# Patient Record
Sex: FEMALE | Race: OTHER | HISPANIC OR LATINO | Employment: FULL TIME | URBAN - METROPOLITAN AREA
[De-identification: names, ages, dates, MRNs, and addresses within clinical notes are randomized per-mention and may not be internally consistent; named-entity substitution may affect disease eponyms.]

---

## 2023-06-08 ENCOUNTER — TELEPHONE (OUTPATIENT)
Dept: PRIMARY CARE | Facility: CLINIC | Age: 38
End: 2023-06-08
Payer: COMMERCIAL

## 2023-06-08 DIAGNOSIS — L24.7 IRRITANT CONTACT DERMATITIS DUE TO PLANTS, EXCEPT FOOD: Primary | ICD-10-CM

## 2023-06-08 RX ORDER — PREDNISONE 50 MG/1
50 TABLET ORAL DAILY
Qty: 5 TABLET | Refills: 0 | Status: SHIPPED | OUTPATIENT
Start: 2023-06-08 | End: 2023-06-13

## 2023-06-08 NOTE — TELEPHONE ENCOUNTER
Former [facility] patient with [facility]. Her mother Susy is here for her own appointment and brought in pictures of Cassie's rash.   Prednisone 50 mg x 5 days sent to pharmacy. Patient aware.  Chanda Palma, DO

## 2023-06-08 NOTE — TELEPHONE ENCOUNTER
LMOM for patient to call office, when call is returned please give patient provider's message. Attempted to leave a VM, but got a busy signal.

## 2023-06-08 NOTE — TELEPHONE ENCOUNTER
Former [facility] patient last seen in December. Needs to establish with new PCP. She is  so I am ok taking her on as a new patient if she would like to see me. Please reach out to offer to schedule - 30 min appointment. Thanks,  Chanda Palma, DO

## 2023-07-25 LAB
ALANINE AMINOTRANSFERASE (SGPT) (U/L) IN SER/PLAS: 7 U/L (ref 7–45)
ALBUMIN (G/DL) IN SER/PLAS: 4.6 G/DL (ref 3.4–5)
ALKALINE PHOSPHATASE (U/L) IN SER/PLAS: 44 U/L (ref 33–110)
ANION GAP IN SER/PLAS: 10 MMOL/L (ref 10–20)
ASPARTATE AMINOTRANSFERASE (SGOT) (U/L) IN SER/PLAS: 12 U/L (ref 9–39)
BILIRUBIN TOTAL (MG/DL) IN SER/PLAS: 0.4 MG/DL (ref 0–1.2)
CALCIDIOL (25 OH VITAMIN D3) (NG/ML) IN SER/PLAS: 20 NG/ML
CALCIUM (MG/DL) IN SER/PLAS: 8.7 MG/DL (ref 8.6–10.3)
CARBON DIOXIDE, TOTAL (MMOL/L) IN SER/PLAS: 29 MMOL/L (ref 21–32)
CHLORIDE (MMOL/L) IN SER/PLAS: 106 MMOL/L (ref 98–107)
CREATININE (MG/DL) IN SER/PLAS: 1.08 MG/DL (ref 0.5–1.05)
GFR FEMALE: 66 ML/MIN/1.73M2
GLUCOSE (MG/DL) IN SER/PLAS: 88 MG/DL (ref 74–99)
POTASSIUM (MMOL/L) IN SER/PLAS: 3.9 MMOL/L (ref 3.5–5.3)
PROTEIN TOTAL: 7.2 G/DL (ref 6.4–8.2)
SODIUM (MMOL/L) IN SER/PLAS: 141 MMOL/L (ref 136–145)
THYROTROPIN (MIU/L) IN SER/PLAS BY DETECTION LIMIT <= 0.05 MIU/L: 55 MIU/L (ref 0.44–3.98)
THYROXINE (T4) FREE (NG/DL) IN SER/PLAS: 0.46 NG/DL (ref 0.61–1.12)
UREA NITROGEN (MG/DL) IN SER/PLAS: 11 MG/DL (ref 6–23)

## 2023-07-26 LAB
ESTIMATED AVERAGE GLUCOSE FOR HBA1C: 108 MG/DL
HEMOGLOBIN A1C/HEMOGLOBIN TOTAL IN BLOOD: 5.4 %

## 2023-07-28 LAB
THYROGLOBULIN AB (IU/ML) IN SER/PLAS: <0.9 IU/ML (ref 0–4)
THYROGLOBULIN LC-MS/MS: ABNORMAL NG/ML (ref 1.3–31.8)
THYROGLOBULIN: <0.1 NG/ML (ref 1.3–31.8)

## 2023-10-12 ENCOUNTER — OFFICE VISIT (OUTPATIENT)
Dept: PRIMARY CARE | Facility: CLINIC | Age: 38
End: 2023-10-12
Payer: COMMERCIAL

## 2023-10-12 VITALS
WEIGHT: 142 LBS | DIASTOLIC BLOOD PRESSURE: 59 MMHG | TEMPERATURE: 96.6 F | SYSTOLIC BLOOD PRESSURE: 95 MMHG | BODY MASS INDEX: 23.63 KG/M2

## 2023-10-12 DIAGNOSIS — L24.7 IRRITANT CONTACT DERMATITIS DUE TO PLANTS, EXCEPT FOOD: Primary | ICD-10-CM

## 2023-10-12 PROBLEM — E55.9 VITAMIN D DEFICIENCY: Status: ACTIVE | Noted: 2023-10-12

## 2023-10-12 PROBLEM — R25.2 CRAMPS, MUSCLE, GENERAL: Status: ACTIVE | Noted: 2023-10-12

## 2023-10-12 PROBLEM — H69.93 DYSFUNCTION OF BOTH EUSTACHIAN TUBES: Status: ACTIVE | Noted: 2023-10-12

## 2023-10-12 PROBLEM — R45.89 DEPRESSED MOOD: Status: ACTIVE | Noted: 2023-10-12

## 2023-10-12 PROBLEM — F41.1 GENERALIZED ANXIETY DISORDER: Status: ACTIVE | Noted: 2023-10-12

## 2023-10-12 PROBLEM — R61 NIGHT SWEATS: Status: ACTIVE | Noted: 2023-10-12

## 2023-10-12 PROBLEM — K92.1 HEMATOCHEZIA: Status: ACTIVE | Noted: 2023-10-12

## 2023-10-12 PROBLEM — E89.0 POSTOPERATIVE HYPOTHYROIDISM: Status: ACTIVE | Noted: 2023-10-12

## 2023-10-12 PROBLEM — R10.9 ABDOMINAL PAIN: Status: ACTIVE | Noted: 2023-10-12

## 2023-10-12 PROBLEM — C73 THYROID CANCER (MULTI): Status: ACTIVE | Noted: 2023-10-12

## 2023-10-12 PROBLEM — E03.9 HYPOTHYROIDISM: Status: ACTIVE | Noted: 2023-10-12

## 2023-10-12 PROBLEM — I83.813 VARICOSE VEINS OF BOTH LOWER EXTREMITIES WITH PAIN: Status: ACTIVE | Noted: 2023-10-12

## 2023-10-12 PROBLEM — M79.89 LEG SWELLING: Status: ACTIVE | Noted: 2023-10-12

## 2023-10-12 PROCEDURE — 99213 OFFICE O/P EST LOW 20 MIN: CPT | Performed by: STUDENT IN AN ORGANIZED HEALTH CARE EDUCATION/TRAINING PROGRAM

## 2023-10-12 PROCEDURE — 1036F TOBACCO NON-USER: CPT | Performed by: STUDENT IN AN ORGANIZED HEALTH CARE EDUCATION/TRAINING PROGRAM

## 2023-10-12 RX ORDER — TRIAMCINOLONE ACETONIDE 5 MG/G
CREAM TOPICAL
COMMUNITY
Start: 2023-05-22 | End: 2023-10-12 | Stop reason: SDUPTHER

## 2023-10-12 RX ORDER — ERGOCALCIFEROL 1.25 MG/1
1 CAPSULE ORAL
COMMUNITY

## 2023-10-12 RX ORDER — ESCITALOPRAM OXALATE 10 MG/1
10 TABLET ORAL DAILY
COMMUNITY
End: 2023-12-19 | Stop reason: SDUPTHER

## 2023-10-12 RX ORDER — TRIAMCINOLONE ACETONIDE 5 MG/G
CREAM TOPICAL
Qty: 30 G | Refills: 0 | Status: SHIPPED | OUTPATIENT
Start: 2023-10-12 | End: 2023-10-13 | Stop reason: ALTCHOICE

## 2023-10-12 RX ORDER — LEVOTHYROXINE SODIUM 112 UG/1
112 TABLET ORAL DAILY
COMMUNITY
Start: 2023-08-29

## 2023-10-12 RX ORDER — PREDNISONE 10 MG/1
TABLET ORAL
Qty: 30 TABLET | Refills: 0 | Status: SHIPPED | OUTPATIENT
Start: 2023-10-12 | End: 2023-10-22

## 2023-10-12 RX ORDER — LEVOTHYROXINE SODIUM 88 UG/1
88 TABLET ORAL DAILY
COMMUNITY
End: 2023-10-12 | Stop reason: ALTCHOICE

## 2023-10-12 ASSESSMENT — ENCOUNTER SYMPTOMS
SHORTNESS OF BREATH: 0
MUSCULOSKELETAL NEGATIVE: 1
COLOR CHANGE: 0
HEADACHES: 0
WHEEZING: 0
DIARRHEA: 0
DIZZINESS: 0
CONFUSION: 0
FEVER: 0
VOMITING: 0
ABDOMINAL PAIN: 0
PALPITATIONS: 0
NAUSEA: 0
FATIGUE: 0
CONSTIPATION: 0
COUGH: 0
CHILLS: 0
UNEXPECTED WEIGHT CHANGE: 0

## 2023-10-12 NOTE — PROGRESS NOTES
Subjective   Patient ID: Cassie Simmons is a 38 y.o. female who presents for Rash (Susy's pt is here for rash on her right outer thigh area, says it started a week ago.).    HPI   MM pt here c/o rash. Reports she has rash on her R outer thigh x 1 wk. Reports she was doing some fall cleaning/yard work and noticed the rash next day; very itchy, affecting her sleep; using calamine lotion w/o much help. Reports she had some, small on her fore arms but those are better now however thigh is getting worse; spreading more towards thew buttock area. Reports she had similar allergic rxn in the past after yard work req extensive steroid use; states she is very allergic to lot of plant & pollen.     Review of Systems   Constitutional:  Negative for chills, fatigue, fever and unexpected weight change.   HENT: Negative.     Respiratory:  Negative for cough, shortness of breath and wheezing.    Cardiovascular:  Negative for chest pain, palpitations and leg swelling.   Gastrointestinal:  Negative for abdominal pain, constipation, diarrhea, nausea and vomiting.   Musculoskeletal: Negative.    Skin:  Positive for rash. Negative for color change.   Neurological:  Negative for dizziness and headaches.   Psychiatric/Behavioral:  Negative for behavioral problems and confusion.        Objective   BP 95/59 (BP Location: Left arm, Patient Position: Sitting, BP Cuff Size: Small adult)   Temp 35.9 °C (96.6 °F)   Wt 64.4 kg (142 lb)   BMI 23.63 kg/m²     Physical Exam  Vitals and nursing note reviewed.   Constitutional:       Appearance: Normal appearance.   Cardiovascular:      Rate and Rhythm: Normal rate and regular rhythm.      Pulses: Normal pulses.      Heart sounds: Normal heart sounds.   Pulmonary:      Effort: Pulmonary effort is normal. No respiratory distress.      Breath sounds: Normal breath sounds.   Abdominal:      General: Abdomen is flat. Bowel sounds are normal.      Palpations: Abdomen is soft.   Musculoskeletal:          General: Normal range of motion.   Skin:     Findings: Erythema and rash present.      Comments: R thigh/buttock: lateral outer thigh area up to the knee has diffuse macular erythematous, sl scaly rashes with lot of scratch marks and also on her R buttock area.   Bl fore arms: no acute rash but has dark, healed scabs from past rxn.    Neurological:      General: No focal deficit present.      Mental Status: She is alert and oriented to person, place, and time.   Psychiatric:         Mood and Affect: Mood normal.         Behavior: Behavior normal.       Assessment/Plan   MM pt here c/o rash. She likely has allergic contact dermatitis 2/2 exposure to plant, could be poison ivy vs other as she is sensitive to lot of plants. Will start on prednisone at tapering dose over 10 days d/t worsening of rash and h/o prev severe rxn; also use triamcinolone as needed. Cont emollients daily as needed; may take benadryl 25 mg nightly as needed for pruritus. She is otherwise clinically & vitally stable.  See us if sx not better or seek ER care if sx worsens.   Problem List Items Addressed This Visit    None  Visit Diagnoses         Codes    Irritant contact dermatitis due to plants, except food    -  Primary L24.7    Relevant Medications    predniSONE (Deltasone) 10 mg tablet    triamcinolone (Kenalog) 0.5 % cream          Rtc as shcd to see MM   Davi Bryant MD    [facility]

## 2023-12-19 ENCOUNTER — OFFICE VISIT (OUTPATIENT)
Dept: PRIMARY CARE | Facility: CLINIC | Age: 38
End: 2023-12-19
Payer: COMMERCIAL

## 2023-12-19 VITALS
DIASTOLIC BLOOD PRESSURE: 58 MMHG | TEMPERATURE: 97.5 F | RESPIRATION RATE: 16 BRPM | WEIGHT: 140 LBS | HEART RATE: 78 BPM | SYSTOLIC BLOOD PRESSURE: 98 MMHG | BODY MASS INDEX: 23.32 KG/M2 | HEIGHT: 65 IN

## 2023-12-19 DIAGNOSIS — F41.1 GENERALIZED ANXIETY DISORDER: ICD-10-CM

## 2023-12-19 DIAGNOSIS — Z12.31 BREAST CANCER SCREENING BY MAMMOGRAM: ICD-10-CM

## 2023-12-19 DIAGNOSIS — Z13.220 SCREENING FOR HYPERLIPIDEMIA: ICD-10-CM

## 2023-12-19 DIAGNOSIS — C73 THYROID CANCER (MULTI): ICD-10-CM

## 2023-12-19 DIAGNOSIS — Z23 NEED FOR INFLUENZA VACCINATION: ICD-10-CM

## 2023-12-19 DIAGNOSIS — L30.9 DERMATITIS: ICD-10-CM

## 2023-12-19 DIAGNOSIS — L23.9 ALLERGIC DERMATITIS: ICD-10-CM

## 2023-12-19 DIAGNOSIS — Z00.00 HEALTHCARE MAINTENANCE: Primary | ICD-10-CM

## 2023-12-19 DIAGNOSIS — Z23 NEED FOR TETANUS BOOSTER: ICD-10-CM

## 2023-12-19 PROBLEM — K92.1 HEMATOCHEZIA: Status: RESOLVED | Noted: 2023-10-12 | Resolved: 2023-12-19

## 2023-12-19 PROBLEM — R25.2 CRAMPS, MUSCLE, GENERAL: Status: RESOLVED | Noted: 2023-10-12 | Resolved: 2023-12-19

## 2023-12-19 PROBLEM — R10.9 ABDOMINAL PAIN: Status: RESOLVED | Noted: 2023-10-12 | Resolved: 2023-12-19

## 2023-12-19 PROBLEM — E03.9 HYPOTHYROIDISM: Status: RESOLVED | Noted: 2023-10-12 | Resolved: 2023-12-19

## 2023-12-19 PROCEDURE — 99214 OFFICE O/P EST MOD 30 MIN: CPT

## 2023-12-19 PROCEDURE — 90715 TDAP VACCINE 7 YRS/> IM: CPT

## 2023-12-19 PROCEDURE — 90472 IMMUNIZATION ADMIN EACH ADD: CPT

## 2023-12-19 PROCEDURE — 99396 PREV VISIT EST AGE 40-64: CPT

## 2023-12-19 PROCEDURE — 1036F TOBACCO NON-USER: CPT

## 2023-12-19 PROCEDURE — 90471 IMMUNIZATION ADMIN: CPT

## 2023-12-19 PROCEDURE — 90686 IIV4 VACC NO PRSV 0.5 ML IM: CPT

## 2023-12-19 RX ORDER — ESCITALOPRAM OXALATE 20 MG/1
10 TABLET ORAL DAILY
Qty: 90 TABLET | Refills: 3 | Status: SHIPPED | OUTPATIENT
Start: 2023-12-19

## 2023-12-19 RX ORDER — TRIAMCINOLONE ACETONIDE 1 MG/G
CREAM TOPICAL 2 TIMES DAILY
Qty: 45 G | Refills: 3 | Status: SHIPPED | OUTPATIENT
Start: 2023-12-19

## 2023-12-19 SDOH — ECONOMIC STABILITY: FOOD INSECURITY: WITHIN THE PAST 12 MONTHS, YOU WORRIED THAT YOUR FOOD WOULD RUN OUT BEFORE YOU GOT MONEY TO BUY MORE.: NEVER TRUE

## 2023-12-19 SDOH — ECONOMIC STABILITY: FOOD INSECURITY: WITHIN THE PAST 12 MONTHS, THE FOOD YOU BOUGHT JUST DIDN'T LAST AND YOU DIDN'T HAVE MONEY TO GET MORE.: NEVER TRUE

## 2023-12-19 ASSESSMENT — ENCOUNTER SYMPTOMS
PSYCHIATRIC NEGATIVE: 1
GASTROINTESTINAL NEGATIVE: 1
NEUROLOGICAL NEGATIVE: 1
HEMATOLOGIC/LYMPHATIC NEGATIVE: 1
ENDOCRINE NEGATIVE: 1
RESPIRATORY NEGATIVE: 1
MUSCULOSKELETAL NEGATIVE: 1
CONSTITUTIONAL NEGATIVE: 1
CARDIOVASCULAR NEGATIVE: 1

## 2023-12-19 ASSESSMENT — PAIN SCALES - GENERAL: PAINLEVEL: 0-NO PAIN

## 2023-12-19 ASSESSMENT — LIFESTYLE VARIABLES
HOW MANY STANDARD DRINKS CONTAINING ALCOHOL DO YOU HAVE ON A TYPICAL DAY: PATIENT DOES NOT DRINK
HOW OFTEN DO YOU HAVE A DRINK CONTAINING ALCOHOL: NEVER
HOW OFTEN DO YOU HAVE SIX OR MORE DRINKS ON ONE OCCASION: NEVER
AUDIT-C TOTAL SCORE: 0
SKIP TO QUESTIONS 9-10: 1

## 2023-12-19 ASSESSMENT — PATIENT HEALTH QUESTIONNAIRE - PHQ9
2. FEELING DOWN, DEPRESSED OR HOPELESS: SEVERAL DAYS
SUM OF ALL RESPONSES TO PHQ9 QUESTIONS 1 & 2: 2
1. LITTLE INTEREST OR PLEASURE IN DOING THINGS: SEVERAL DAYS
10. IF YOU CHECKED OFF ANY PROBLEMS, HOW DIFFICULT HAVE THESE PROBLEMS MADE IT FOR YOU TO DO YOUR WORK, TAKE CARE OF THINGS AT HOME, OR GET ALONG WITH OTHER PEOPLE: SOMEWHAT DIFFICULT

## 2023-12-19 NOTE — ASSESSMENT & PLAN NOTE
Reports worsening anxiety, feels anxious when out with her family in crowded spaces, worsening leg jogging    Increase escitalopram to 20mg daily

## 2023-12-19 NOTE — PROGRESS NOTES
Subjective   Patient ID: Cassie Simmons is a 38 y.o. female who presents for annual evaluate and evaluation for vision changes.    Having some blurry vision with walking; noticed that it's harder to read print that is close to her. Gets annual optometry exams at [facility], due for next exam.     Diet: Eating pretty healthy, eating a lot of fast food due to working long shifts (10 hours), carryout and fast food. Tries toward healthy options when ordering out. Doesn't think she's getting many veggies, getting good amount of fruit. Eating good amount of protein. Gets headache if not drinking soda daily, drinking 3 cans of soda per day. Eating a lot of milk chocolate, [employer] cups.  Exercise: Walking a lot at work  Weight: Stable, improved levothyroxine dose has helped  Water: Drinking water with medicine in the morning, otherwise none the rest of the day, will have 1-2 cups of it during the day. Gets nauseated during the day if she drinks a full glass during the day.   Sleep: Some days better than others, getting about 5-6 hours per night, taking melatonin to help fall asleep which helps  Social: , lives in a 2 floor home. 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    HENT: Negative.     Eyes:  Positive for visual disturbance.   Respiratory: Negative.     Cardiovascular: Negative.    Gastrointestinal: Negative.    Endocrine: Negative.    Genitourinary: Negative.    Musculoskeletal: Negative.    Skin: Negative.    Neurological: Negative.    Hematological: Negative.    Psychiatric/Behavioral: Negative.          Current Outpatient Medications   Medication Sig Dispense Refill    ergocalciferol (Vitamin D-2) 1.25 MG (25756 UT) capsule Take 1 capsule (1,250 mcg) by mouth 1 (one) time per week.      levothyroxine (Synthroid, Levoxyl) 112 mcg tablet Take 1 tablet (112 mcg) by mouth once daily.      escitalopram (Lexapro) 20 mg tablet Take 0.5  "tablets (10 mg) by mouth once daily. 90 tablet 3    triamcinolone (Kenalog) 0.1 % cream Apply topically 2 times a day. Apply to affected area 1-2 times daily as needed. 45 g 3     No current facility-administered medications for this visit.     Past Surgical History:   Procedure Laterality Date    CT ABDOMEN PELVIS ANGIOGRAM W AND/OR WO IV CONTRAST  10/17/2022    CT ABDOMEN PELVIS ANGIOGRAM W AND/OR WO IV CONTRAST 10/17/2022 POR ANCILLARY [facility]GAC[facility]    OTHER SURGICAL HISTORY  12/04/2019    Appendectomy    OTHER SURGICAL HISTORY  12/04/2019    Thyroidectomy    OTHER SURGICAL HISTORY  12/04/2019    Tubal ligation    OTHER SURGICAL HISTORY  02/01/2021    Radiofrequency ablation     No family history on file.   Social History     Tobacco Use    Smoking status: Never    Smokeless tobacco: Never   Vaping Use    Vaping Use: Never used   Substance Use Topics    Alcohol use: Never    Drug use: Never        Objective     Visit Vitals  BP 98/58 (BP Location: Right arm, Patient Position: Sitting, BP Cuff Size: Adult)   Pulse 78   Temp 36.4 °C (97.5 °F)   Resp 16   Ht 1.651 m (5' 5\")   Wt 63.5 kg (140 lb)   BMI 23.30 kg/m²   Smoking Status Never   BSA 1.71 m²        Physical Exam  Constitutional:       Appearance: Normal appearance.   HENT:      Head: Normocephalic and atraumatic.   Eyes:      Extraocular Movements: Extraocular movements intact.      Pupils: Pupils are equal, round, and reactive to light.   Cardiovascular:      Rate and Rhythm: Normal rate and regular rhythm.   Pulmonary:      Effort: Pulmonary effort is normal.      Breath sounds: Normal breath sounds.   Abdominal:      General: Abdomen is flat. Bowel sounds are normal.      Palpations: Abdomen is soft.   Musculoskeletal:         General: Normal range of motion.   Skin:     General: Skin is warm and dry.      Capillary Refill: Capillary refill takes less than 2 seconds.   Neurological:      General: No focal deficit present.      Mental Status: She is alert and " oriented to person, place, and time.   Psychiatric:         Mood and Affect: Mood normal.         Behavior: Behavior normal.           Assessment/Plan   Problem List Items Addressed This Visit       Generalized anxiety disorder     Reports worsening anxiety, feels anxious when out with her family in crowded spaces, worsening leg jogging    Increase escitalopram to 20mg daily         Relevant Medications    escitalopram (Lexapro) 20 mg tablet    Other Relevant Orders    Follow Up In Primary Care - Established    RESOLVED: Thyroid cancer (CMS/HCC)     S/p thyroidectomy in 2017, this was curative therapy  She is now maintained on levothyroxine and was discharged from oncology         Allergic dermatitis     Triamcinolone cream to help with limited allergic reaction from environmental exposure  Refer to allergist for evaluation of allergies         Relevant Orders    Referral to Allergy    Healthcare maintenance - Primary     -Healthy diet, good quality and duration of sleep, healthy water intake, regular exercise and healthy weight discussed  -Immunizations:   Flu: Recommended annually  Tdap: Recommended and administered today  -Following with dentistry and optometry regularly  -Colon cancer screening: Not currently indicated  -Mammogram: Recommended and ordered  -GYN: Following annually  -Some concerns for anxiety/depression  -Tobacco never, EtOH none, No illicit/recreational drugs  -Feeling safe at home           Other Visit Diagnoses       Dermatitis        Relevant Medications    triamcinolone (Kenalog) 0.1 % cream    Screening for hyperlipidemia        Relevant Orders    Lipid Panel    Breast cancer screening by mammogram        Relevant Orders    BI mammo bilateral screening tomosynthesis    Need for influenza vaccination        Relevant Orders    Flu vaccine (IIV4) age 6 months and greater, preservative free    Need for tetanus booster        Relevant Orders    Tdap vaccine, age 7 years and older            All  pertinent lab work and results were reviewed with patient.     Follow up with me in 4 months for anxiety    Susy Munoz, JIGNA-CNS

## 2023-12-19 NOTE — ASSESSMENT & PLAN NOTE
S/p thyroidectomy in 2017, this was curative therapy  She is now maintained on levothyroxine and was discharged from oncology

## 2023-12-19 NOTE — ASSESSMENT & PLAN NOTE
-Healthy diet, good quality and duration of sleep, healthy water intake, regular exercise and healthy weight discussed  -Immunizations:   Flu: Recommended annually  Tdap: Recommended and administered today  -Following with dentistry and optometry regularly  -Colon cancer screening: Not currently indicated  -Mammogram: Recommended and ordered  -GYN: Following annually  -Some concerns for anxiety/depression  -Tobacco never, EtOH none, No illicit/recreational drugs  -Feeling safe at home

## 2023-12-19 NOTE — ASSESSMENT & PLAN NOTE
Triamcinolone cream to help with limited allergic reaction from environmental exposure  Refer to allergist for evaluation of allergies

## 2024-01-12 NOTE — PATIENT INSTRUCTIONS
Thank you for coming to see me today.  If you have any questions or concerns following our visit, please contact the office.  Phone: [phone]    Follow up with me in 4 months or sooner as needed    1)  I am referring you to allergy/immunology for evaluation of allergic response over the summer - please call [phone] to schedule an appointment or stop to 's office (in the lab) on your way out today      2) Get fasting lipid panel with next labs from Dr. Gaines    3) Please schedule a mammogram - please call [phone] or stop to 's office (in the lab office) on your way out today.     4) INCREASE escitalopram to 20mg daily to help with anxiety    5) START triamcinolone cream as needed twice daily for rash as long as no open wound.   Statement Selected

## 2024-02-06 ENCOUNTER — APPOINTMENT (OUTPATIENT)
Dept: OBSTETRICS AND GYNECOLOGY | Facility: CLINIC | Age: 39
End: 2024-02-06
Payer: COMMERCIAL

## 2024-04-10 ENCOUNTER — HOSPITAL ENCOUNTER (OUTPATIENT)
Dept: RADIOLOGY | Facility: HOSPITAL | Age: 39
Discharge: HOME | End: 2024-04-10
Payer: COMMERCIAL

## 2024-04-10 ENCOUNTER — LAB (OUTPATIENT)
Dept: LAB | Facility: LAB | Age: 39
End: 2024-04-10
Payer: COMMERCIAL

## 2024-04-10 VITALS — HEIGHT: 64 IN | BODY MASS INDEX: 24.41 KG/M2 | WEIGHT: 143 LBS

## 2024-04-10 DIAGNOSIS — Z13.220 SCREENING FOR HYPERLIPIDEMIA: ICD-10-CM

## 2024-04-10 DIAGNOSIS — E55.9 VITAMIN D DEFICIENCY: ICD-10-CM

## 2024-04-10 DIAGNOSIS — E89.0 POSTOPERATIVE HYPOTHYROIDISM: ICD-10-CM

## 2024-04-10 DIAGNOSIS — Z12.31 BREAST CANCER SCREENING BY MAMMOGRAM: ICD-10-CM

## 2024-04-10 DIAGNOSIS — R92.8 ABNORMALITY OF LEFT BREAST ON SCREENING MAMMOGRAPHY: Primary | ICD-10-CM

## 2024-04-10 DIAGNOSIS — C73 THYROID CANCER (MULTI): ICD-10-CM

## 2024-04-10 LAB
25(OH)D3 SERPL-MCNC: 31 NG/ML (ref 30–100)
CHOLEST SERPL-MCNC: 202 MG/DL (ref 0–199)
CHOLESTEROL/HDL RATIO: 3.1
HDLC SERPL-MCNC: 64.5 MG/DL
LDLC SERPL CALC-MCNC: 126 MG/DL
NON HDL CHOLESTEROL: 138 MG/DL (ref 0–149)
TRIGL SERPL-MCNC: 56 MG/DL (ref 0–149)
TSH SERPL-ACNC: 0.64 MIU/L (ref 0.44–3.98)
VLDL: 11 MG/DL (ref 0–40)

## 2024-04-10 PROCEDURE — 77067 SCR MAMMO BI INCL CAD: CPT

## 2024-04-10 PROCEDURE — 84443 ASSAY THYROID STIM HORMONE: CPT

## 2024-04-10 PROCEDURE — 86800 THYROGLOBULIN ANTIBODY: CPT

## 2024-04-10 PROCEDURE — 77067 SCR MAMMO BI INCL CAD: CPT | Performed by: RADIOLOGY

## 2024-04-10 PROCEDURE — 82306 VITAMIN D 25 HYDROXY: CPT

## 2024-04-10 PROCEDURE — 36415 COLL VENOUS BLD VENIPUNCTURE: CPT

## 2024-04-10 PROCEDURE — 80061 LIPID PANEL: CPT

## 2024-04-10 PROCEDURE — 77063 BREAST TOMOSYNTHESIS BI: CPT | Performed by: RADIOLOGY

## 2024-04-10 PROCEDURE — 84432 ASSAY OF THYROGLOBULIN: CPT

## 2024-04-12 LAB
BILL ONLY-THYROGLOBULIN: NORMAL
THYROGLOB AB SERPL-ACNC: <0.9 IU/ML (ref 0–4)
THYROGLOB SERPL-MCNC: <0.1 NG/ML (ref 1.3–31.8)
THYROGLOB SERPL-MCNC: ABNORMAL NG/ML (ref 1.3–31.8)

## 2024-04-17 ENCOUNTER — APPOINTMENT (OUTPATIENT)
Dept: PRIMARY CARE | Facility: CLINIC | Age: 39
End: 2024-04-17
Payer: COMMERCIAL

## 2024-04-26 ENCOUNTER — HOSPITAL ENCOUNTER (OUTPATIENT)
Dept: RADIOLOGY | Facility: CLINIC | Age: 39
Discharge: HOME | End: 2024-04-26
Payer: COMMERCIAL

## 2024-04-26 VITALS — BODY MASS INDEX: 24.41 KG/M2 | WEIGHT: 143 LBS | HEIGHT: 64 IN

## 2024-04-26 DIAGNOSIS — R92.8 ABNORMALITY OF LEFT BREAST ON SCREENING MAMMOGRAPHY: ICD-10-CM

## 2024-04-26 PROCEDURE — 76642 ULTRASOUND BREAST LIMITED: CPT | Mod: LEFT SIDE | Performed by: STUDENT IN AN ORGANIZED HEALTH CARE EDUCATION/TRAINING PROGRAM

## 2024-04-26 PROCEDURE — 77061 BREAST TOMOSYNTHESIS UNI: CPT | Mod: LT

## 2024-04-26 PROCEDURE — 77061 BREAST TOMOSYNTHESIS UNI: CPT | Mod: LEFT SIDE | Performed by: STUDENT IN AN ORGANIZED HEALTH CARE EDUCATION/TRAINING PROGRAM

## 2024-04-26 PROCEDURE — 77065 DX MAMMO INCL CAD UNI: CPT | Mod: LEFT SIDE | Performed by: STUDENT IN AN ORGANIZED HEALTH CARE EDUCATION/TRAINING PROGRAM

## 2024-04-26 PROCEDURE — 76642 ULTRASOUND BREAST LIMITED: CPT | Mod: LT

## 2024-05-13 ENCOUNTER — APPOINTMENT (OUTPATIENT)
Dept: PRIMARY CARE | Facility: CLINIC | Age: 39
End: 2024-05-13
Payer: COMMERCIAL

## 2024-06-13 ENCOUNTER — APPOINTMENT (OUTPATIENT)
Dept: PRIMARY CARE | Facility: CLINIC | Age: 39
End: 2024-06-13
Payer: COMMERCIAL

## 2024-07-10 DIAGNOSIS — C73 THYROID CANCER (MULTI): Primary | ICD-10-CM

## 2024-07-10 RX ORDER — LEVOTHYROXINE SODIUM 112 UG/1
112 TABLET ORAL DAILY
Qty: 90 TABLET | Refills: 0 | Status: SHIPPED | OUTPATIENT
Start: 2024-07-10

## 2024-08-08 ENCOUNTER — APPOINTMENT (OUTPATIENT)
Dept: DERMATOLOGY | Facility: CLINIC | Age: 39
End: 2024-08-08
Payer: COMMERCIAL

## 2024-08-26 ENCOUNTER — APPOINTMENT (OUTPATIENT)
Dept: PRIMARY CARE | Facility: CLINIC | Age: 39
End: 2024-08-26
Payer: COMMERCIAL

## 2024-08-26 VITALS
OXYGEN SATURATION: 99 % | DIASTOLIC BLOOD PRESSURE: 61 MMHG | BODY MASS INDEX: 24.89 KG/M2 | SYSTOLIC BLOOD PRESSURE: 94 MMHG | WEIGHT: 145 LBS | RESPIRATION RATE: 17 BRPM | HEART RATE: 70 BPM | TEMPERATURE: 97.5 F

## 2024-08-26 DIAGNOSIS — F41.1 GENERALIZED ANXIETY DISORDER: ICD-10-CM

## 2024-08-26 DIAGNOSIS — E55.9 VITAMIN D DEFICIENCY: ICD-10-CM

## 2024-08-26 DIAGNOSIS — Z13.0 SCREENING FOR DEFICIENCY ANEMIA: ICD-10-CM

## 2024-08-26 DIAGNOSIS — L23.9 ALLERGIC DERMATITIS: Primary | ICD-10-CM

## 2024-08-26 DIAGNOSIS — Z13.89 SCREENING FOR NEPHROPATHY: ICD-10-CM

## 2024-08-26 DIAGNOSIS — Z13.220 SCREENING FOR HYPERLIPIDEMIA: ICD-10-CM

## 2024-08-26 PROCEDURE — 99213 OFFICE O/P EST LOW 20 MIN: CPT

## 2024-08-26 PROCEDURE — 1036F TOBACCO NON-USER: CPT

## 2024-08-26 SDOH — ECONOMIC STABILITY: FOOD INSECURITY: WITHIN THE PAST 12 MONTHS, THE FOOD YOU BOUGHT JUST DIDN'T LAST AND YOU DIDN'T HAVE MONEY TO GET MORE.: NEVER TRUE

## 2024-08-26 SDOH — ECONOMIC STABILITY: FOOD INSECURITY: WITHIN THE PAST 12 MONTHS, YOU WORRIED THAT YOUR FOOD WOULD RUN OUT BEFORE YOU GOT MONEY TO BUY MORE.: NEVER TRUE

## 2024-08-26 ASSESSMENT — ENCOUNTER SYMPTOMS
GASTROINTESTINAL NEGATIVE: 1
PSYCHIATRIC NEGATIVE: 1
CARDIOVASCULAR NEGATIVE: 1
EYES NEGATIVE: 1
CONSTITUTIONAL NEGATIVE: 1
HEMATOLOGIC/LYMPHATIC NEGATIVE: 1
NEUROLOGICAL NEGATIVE: 1
ENDOCRINE NEGATIVE: 1
RESPIRATORY NEGATIVE: 1
MUSCULOSKELETAL NEGATIVE: 1

## 2024-08-26 ASSESSMENT — PATIENT HEALTH QUESTIONNAIRE - PHQ9
2. FEELING DOWN, DEPRESSED OR HOPELESS: NOT AT ALL
SUM OF ALL RESPONSES TO PHQ9 QUESTIONS 1 & 2: 0
1. LITTLE INTEREST OR PLEASURE IN DOING THINGS: NOT AT ALL

## 2024-08-26 ASSESSMENT — PAIN SCALES - GENERAL: PAINLEVEL: 0-NO PAIN

## 2024-08-26 ASSESSMENT — LIFESTYLE VARIABLES
SKIP TO QUESTIONS 9-10: 1
HOW OFTEN DO YOU HAVE A DRINK CONTAINING ALCOHOL: NEVER
HOW OFTEN DO YOU HAVE SIX OR MORE DRINKS ON ONE OCCASION: NEVER
AUDIT-C TOTAL SCORE: 0
HOW MANY STANDARD DRINKS CONTAINING ALCOHOL DO YOU HAVE ON A TYPICAL DAY: PATIENT DOES NOT DRINK

## 2024-08-26 NOTE — PROGRESS NOTES
Subjective   Patient ID: Cassie Simmons is a 39 y.o. female who presents for follow up of anxiety.    Escitalopram increased at last visit to 20 mg. States this made her very tired during her drive home and she self stopped.  Thinks that having thyroid med adjustment has helped with energy and mood.   Doing well right now, feeling mood is more stable.    Last week for a few days her hands turned blue and fingers were numb and tingling. Lasted for a few minutes and self-resolved. States this has happened a few times.     Diet: Eating pretty healthy, eating a lot of fast food due to working long shifts (10 hours), carryout and fast food. Tries toward healthy options when ordering out. Doesn't think she's getting many veggies, getting good amount of fruit. Eating good amount of protein. Gets headache if not drinking soda daily, drinking 3 cans of soda per day. Eating a lot of milk chocolate, [employer] cups.  Exercise: Walking a lot at work  Weight: Stable, improved levothyroxine dose has helped  Water: Drinking water with medicine in the morning, otherwise none the rest of the day, will have 1-2 cups of it during the day. Gets nauseated during the day if she drinks a full glass during the day.   Sleep: Some days better than others, getting about 5-6 hours per night, taking melatonin to help fall asleep which helps  Social: , lives in a 2 floor home. 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    HENT: Negative.     Eyes: Negative.    Respiratory: Negative.     Cardiovascular: Negative.    Gastrointestinal: Negative.    Endocrine: Negative.    Genitourinary: Negative.    Musculoskeletal: Negative.    Skin: Negative.    Neurological: Negative.    Hematological: Negative.    Psychiatric/Behavioral: Negative.          Current Outpatient Medications   Medication Sig Dispense Refill    ergocalciferol (Vitamin D-2) 1.25 MG (36816 UT) capsule Take  1 capsule (1,250 mcg) by mouth 1 (one) time per week.      levothyroxine (Synthroid, Levoxyl) 112 mcg tablet Take 1 tablet (112 mcg) by mouth early in the morning.. MUST BE SEEN FOR FURTHER REFILLS 90 tablet 0    triamcinolone (Kenalog) 0.1 % cream Apply topically 2 times a day. Apply to affected area 1-2 times daily as needed. 45 g 3     No current facility-administered medications for this visit.     Past Surgical History:   Procedure Laterality Date    CT ABDOMEN PELVIS ANGIOGRAM W AND/OR WO IV CONTRAST  10/17/2022    CT ABDOMEN PELVIS ANGIOGRAM W AND/OR WO IV CONTRAST 10/17/2022 POR ANCILLARY LEGACY    OTHER SURGICAL HISTORY  12/04/2019    Appendectomy    OTHER SURGICAL HISTORY  12/04/2019    Thyroidectomy    OTHER SURGICAL HISTORY  12/04/2019    Tubal ligation    OTHER SURGICAL HISTORY  02/01/2021    Radiofrequency ablation     Family History   Problem Relation Name Age of Onset    Breast cancer Mother's Sister        Social History     Tobacco Use    Smoking status: Never    Smokeless tobacco: Never   Vaping Use    Vaping status: Never Used   Substance Use Topics    Alcohol use: Never    Drug use: Never        Objective     Visit Vitals  BP 94/61 (BP Location: Left arm, Patient Position: Sitting)   Pulse 70   Temp 36.4 °C (97.5 °F) (Temporal)   Resp 17   Wt 65.8 kg (145 lb)   SpO2 99%   BMI 24.89 kg/m²   OB Status Having periods   Smoking Status Never   BSA 1.72 m²        Physical Exam  Constitutional:       Appearance: Normal appearance.   HENT:      Head: Normocephalic and atraumatic.   Eyes:      Extraocular Movements: Extraocular movements intact.      Pupils: Pupils are equal, round, and reactive to light.   Pulmonary:      Effort: Pulmonary effort is normal.   Musculoskeletal:         General: Normal range of motion.   Skin:     General: Skin is warm and dry.      Capillary Refill: Capillary refill takes less than 2 seconds.   Neurological:      General: No focal deficit present.      Mental Status: She  is alert and oriented to person, place, and time.   Psychiatric:         Mood and Affect: Mood normal.         Behavior: Behavior normal.           Assessment/Plan   Problem List Items Addressed This Visit       Generalized anxiety disorder     Self-discontinued escitalopram due to fatigue with dose change and while driving  Mood stable since she's had levothyroxine dose change  Not interested in restarting escitalopram  Advised to contact me if anxiety becomes an issue in the future         Vitamin D deficiency    Relevant Orders    Vitamin D 25-Hydroxy,Total (for eval of Vitamin D levels)    Allergic dermatitis - Primary    Relevant Orders    Referral to Allergy     Other Visit Diagnoses       Screening for deficiency anemia        Relevant Orders    CBC    Screening for nephropathy        Relevant Orders    Comprehensive Metabolic Panel    Screening for hyperlipidemia        Relevant Orders    Lipid Panel            All pertinent lab work and results were reviewed with patient.     Follow up with me in April 2025 for JIGNA Ray-CNS

## 2024-08-26 NOTE — PATIENT INSTRUCTIONS
Thank you for coming to see me today.  If you have any questions or concerns following our visit, please contact the office.  Phone: [phone]    Follow up with me in April 2025 for physical    1)  I am referring you to allergy/immunology for evaluation and treatment of seasonal allergies/. Please call your insurance company to get the names of local providers in network    2) Call Dr. Isabel to evaluate finger numbness/blue tinged extremities    3) Get fasting labwork a few days prior to next visit.  The lab is down the [address] from our office.

## 2024-08-26 NOTE — ASSESSMENT & PLAN NOTE
Self-discontinued escitalopram due to fatigue with dose change and while driving  Mood stable since she's had levothyroxine dose change  Not interested in restarting escitalopram  Advised to contact me if anxiety becomes an issue in the future

## 2024-10-03 DIAGNOSIS — C73 THYROID CANCER (MULTI): ICD-10-CM

## 2024-10-03 RX ORDER — LEVOTHYROXINE SODIUM 112 UG/1
TABLET ORAL
Qty: 90 TABLET | Refills: 1 | Status: SHIPPED | OUTPATIENT
Start: 2024-10-03

## 2024-11-18 DIAGNOSIS — C73 THYROID CANCER (MULTI): ICD-10-CM

## 2024-11-19 DIAGNOSIS — E55.9 VITAMIN D DEFICIENCY: Primary | ICD-10-CM

## 2024-11-19 RX ORDER — ERGOCALCIFEROL 1.25 MG/1
1 CAPSULE ORAL
Qty: 12 CAPSULE | Refills: 1 | Status: SHIPPED | OUTPATIENT
Start: 2024-11-19

## 2024-11-19 RX ORDER — LEVOTHYROXINE SODIUM 112 UG/1
112 TABLET ORAL DAILY
Qty: 90 TABLET | Refills: 1 | Status: SHIPPED | OUTPATIENT
Start: 2024-11-19

## 2024-11-20 ENCOUNTER — OFFICE VISIT (OUTPATIENT)
Dept: ORTHOPEDIC SURGERY | Age: 39
End: 2024-11-20
Payer: COMMERCIAL

## 2024-11-20 ENCOUNTER — HOSPITAL ENCOUNTER (OUTPATIENT)
Dept: RADIOLOGY | Facility: EXTERNAL LOCATION | Age: 39
Discharge: HOME | End: 2024-11-20

## 2024-11-20 VITALS — BODY MASS INDEX: 24.75 KG/M2 | HEIGHT: 64 IN | WEIGHT: 145 LBS

## 2024-11-20 DIAGNOSIS — M75.81 RIGHT ROTATOR CUFF TENDINITIS: Primary | ICD-10-CM

## 2024-11-20 DIAGNOSIS — M25.511 ACUTE PAIN OF RIGHT SHOULDER: ICD-10-CM

## 2024-11-20 DIAGNOSIS — M25.511 RIGHT SHOULDER PAIN, UNSPECIFIED CHRONICITY: ICD-10-CM

## 2024-11-20 PROCEDURE — 99204 OFFICE O/P NEW MOD 45 MIN: CPT | Performed by: EMERGENCY MEDICINE

## 2024-11-20 PROCEDURE — 3008F BODY MASS INDEX DOCD: CPT | Performed by: EMERGENCY MEDICINE

## 2024-11-20 PROCEDURE — 20611 DRAIN/INJ JOINT/BURSA W/US: CPT | Performed by: EMERGENCY MEDICINE

## 2024-11-20 RX ORDER — TRIAMCINOLONE ACETONIDE 40 MG/ML
80 INJECTION, SUSPENSION INTRA-ARTICULAR; INTRAMUSCULAR
Status: COMPLETED | OUTPATIENT
Start: 2024-11-20 | End: 2024-11-20

## 2024-11-20 RX ORDER — LIDOCAINE HYDROCHLORIDE 10 MG/ML
2 INJECTION, SOLUTION INFILTRATION; PERINEURAL
Status: COMPLETED | OUTPATIENT
Start: 2024-11-20 | End: 2024-11-20

## 2024-11-20 RX ORDER — MELOXICAM 15 MG/1
15 TABLET ORAL DAILY
Qty: 360 TABLET | Refills: 0 | Status: SHIPPED | OUTPATIENT
Start: 2024-11-20 | End: 2025-11-20

## 2024-11-20 NOTE — PROGRESS NOTES
Subjective    Patient ID: Cassie Simmons is a 39 y.o. female.    Chief Complaint: Pain of the Right Shoulder (Pain for 3 weeks. Denies any injury, pain is worse when she tries to lift things up. /Xr today)     Last Surgery: No surgery found  Last Surgery Date: No surgery found    Cassie is a very pleasant 39-year-old female who is coming in with about 3 weeks of right shoulder pain.  She is left-hand dominant.  She denies any traumatic event or known injury but states that about 3 weeks ago she woke up from sleep and was having pain in the lateral aspect of the right shoulder.  She is having pain with range of motion.  No weakness or numbness but she states that when she tries to use it her pain is worse.  She takes ibuprofen with good relief but after it wears off the pain returns.  The pain is diffuse but does not radiate up into the neck.  Today her pain is about 6 out of 10.  She denies any infectious symptoms.  No similar prior episodes.  She was referred here by her PCP, Susy Munoz.        Objective   Right Shoulder Exam     Tenderness   The patient is experiencing no tenderness.    Range of Motion   The patient has normal right shoulder ROM.  Right shoulder active abduction: Range of motion is intact but active abduction is very painful.     Muscle Strength   The patient has normal right shoulder strength.  Abduction: 4/5   Internal rotation: 5/5   External rotation: 4/5   Supraspinatus: 4/5   Subscapularis: 5/5   Biceps: 5/5     Tests   Walsh test: positive  Impingement: positive    Other   Erythema: absent  Sensation: normal  Pulse: present    Comments:  Some slight weakness on exam specifically with testing abduction, possibly secondary to guarding from pain.  No evidence of instability        Left Shoulder Exam   Left shoulder exam is normal.             Image Results:  Point of Care Ultrasound  These images are not reportable by radiology and will not be interpreted   by  Radiologists.    X-rays  of the right shoulder were reviewed and interpreted by me on 11/20/2024 and were grossly unremarkable without any evidence of acute injury or fracture.    Patient ID: Cassie Simmons is a 39 y.o. female.    L Inj/Asp: R subacromial bursa on 11/20/2024 10:41 AM  Indications: pain  Details: 22 G needle, ultrasound-guided lateral approach  Medications: 80 mg triamcinolone acetonide 40 mg/mL; 2 mL lidocaine 10 mg/mL (1 %)  Outcome: tolerated well, no immediate complications  Procedure, treatment alternatives, risks and benefits explained, specific risks discussed. Consent was given by the patient. Immediately prior to procedure a time out was called to verify the correct patient, procedure, equipment, support staff and site/side marked as required. Patient was prepped and draped in the usual sterile fashion.           Assessment/Plan   Encounter Diagnoses:  Right rotator cuff tendinitis    Right shoulder pain, unspecified chronicity    Acute pain of right shoulder    Orders Placed This Encounter    L Inj/Asp    XR shoulder right 2+ views    Point of Care Ultrasound    Referral to Physical Therapy    meloxicam (Mobic) 15 mg tablet     No follow-ups on file.    We discussed her treatment options and agreed to perform a right subacromial cortisone injection under ultrasound guidance.  The patient tolerated the procedure well without any complications and activity modifications were reviewed.  She is also going to start physical therapy with an emphasis on developing and implementing a home exercise program and will begin taking meloxicam daily for the next month.  I will then follow-up with her in about 4 to 6 weeks to see how she is responding to this treatment plan.    ** Please excuse any errors in grammar or translation related to this dictation. Voice recognition software was utilized to prepare this document. **       Simon Botello MD  [facility]

## 2024-12-17 ENCOUNTER — EVALUATION (OUTPATIENT)
Dept: PHYSICAL THERAPY | Facility: HOSPITAL | Age: 39
End: 2024-12-17
Payer: COMMERCIAL

## 2024-12-17 DIAGNOSIS — R29.898 WEAKNESS OF RIGHT SHOULDER: Primary | ICD-10-CM

## 2024-12-17 DIAGNOSIS — M75.81 RIGHT ROTATOR CUFF TENDINITIS: ICD-10-CM

## 2024-12-17 PROCEDURE — 97110 THERAPEUTIC EXERCISES: CPT | Mod: GP | Performed by: PHYSICAL THERAPIST

## 2024-12-17 PROCEDURE — 97161 PT EVAL LOW COMPLEX 20 MIN: CPT | Mod: GP | Performed by: PHYSICAL THERAPIST

## 2024-12-17 ASSESSMENT — PAIN - FUNCTIONAL ASSESSMENT: PAIN_FUNCTIONAL_ASSESSMENT: 0-10

## 2024-12-17 ASSESSMENT — ENCOUNTER SYMPTOMS
LOSS OF SENSATION IN FEET: 0
OCCASIONAL FEELINGS OF UNSTEADINESS: 0
DEPRESSION: 0

## 2024-12-17 ASSESSMENT — PATIENT HEALTH QUESTIONNAIRE - PHQ9
1. LITTLE INTEREST OR PLEASURE IN DOING THINGS: NOT AT ALL
SUM OF ALL RESPONSES TO PHQ9 QUESTIONS 1 AND 2: 0
2. FEELING DOWN, DEPRESSED OR HOPELESS: NOT AT ALL

## 2024-12-17 ASSESSMENT — PAIN SCALES - GENERAL: PAINLEVEL_OUTOF10: 7

## 2024-12-17 NOTE — PROGRESS NOTES
Physical Therapy    Physical Therapy Evaluation    Patient Name: Cassie Simmons  MRN: [id number]  Today's Date: 12/17/2024  Time Calculation  Start Time: 1018  Stop Time: 1056  Time Calculation (min): 38 min    PT Evaluation Time Entry  PT Evaluation (Low) Time Entry: 30  PT Therapeutic Procedures Time Entry  Therapeutic Exercise Time Entry: 8                   Visit # 1  Assessment  PT Assessment Results: Decreased range of motion, Decreased strength, Pain  Rehab Prognosis: Good  Evaluation/Treatment Tolerance: Patient tolerated treatment well  Pt. Is a 40 y/o female with dx R RTC tendonitis. Pt. With with decrease ROM, strength and pain in R shld with increased use. Pt. With tightness anterior shld and pain and pain with bicep mm. Palpation. Pt. Would benefit from skilled PT to address the above deficits.     Plan  Treatment/Interventions: Cryotherapy, Hot pack, Education/ Instruction, Self care/ home management, Therapeutic exercises, Therapeutic activities  PT Plan: Skilled PT  Rehab Potential: Good  Plan of Care Agreement: Patient   1-2x/wk x 4-6 weeks.     Current Problem  1. Weakness of right shoulder        2. Right rotator cuff tendinitis  Referral to Physical Therapy    Follow Up In Physical Therapy          Subjective   Pt. States about a month in a half ago pt. Woke up with pain R shld. Pt. States no reason for injury. Pt. Thought she slept wrong. Pt. States she is with constant pain, but worse with movement, reaching up and reaching up back, work and daily household activities. . Pt. With injection R shld.   Xrays done    General:  General  Reason for Referral: intitial eval  Referred By: Dr. Rosmery MD  Preferred Learning Style: verbal  Precautions:  Precautions  STEADI Fall Risk Score (The score of 4 or more indicates an increased risk of falling): 0  Medical Precautions:  (7 years ago thyroid CA)       Pain:  Pain Assessment: 0-10  0-10 (Numeric) Pain Score: 7  Pain Type: Acute pain  Pain Location:  Shoulder  Pain Orientation: Right  Home Living:  wnl   Prior Function Per Pt/Caregiver Report:  Works as a  / manager.      Objective   Posture:           Range of Motion:  Shld:  AROM  L wnl  R:   Flex: 110  Scap 85  ER to back of ear  IR: L1    PROM:   R:   Flex 85  Scap 110  IR 80  ER 30     Strength:    L shld wnl    R shld :  3+/5 in gege ROM       Flexibility:       Palpation:    Pain and tightness R anterior shld and bicep mm. ; pain with palpation to infraspinatus mm.    Special Tests:  R shld:  Speeds: (+)  Walsh angela: (-)  Neers (+)  Empty Can (+)     Gait:.wfl indep        Other:  Visit 1: 12/17/24 Eval and HEP    EXERCISES       Date       VISIT# # # # #    REPS REPS REPS REPS          Table slides       flex       scap              UE ranger              When able:  Pulleys  Flex   Scap   ir              Tband  Row  Pull down  Ir  Er  bicep                                                                                                                                                   HEP                 Outcome Measures:   Dash: 52.27    OP EDUCATION:  Access Code: [email or web address]  URL: [email or web address]  Date: 12/17/2024  Prepared by: Roxanna Curry    Exercises  - Seated Scapular Retraction  - 5 x daily - 7 x weekly - 3 sets - 10 reps  - Supine Shoulder Flexion Extension AAROM with Dowel  - 2 x daily - 7 x weekly - 2 sets - 5-10 reps  - Seated Elbow Flexion and Extension AROM  - 2 x daily - 7 x weekly - 3 sets - 10 reps  Outpatient Education  Individual(s) Educated: Patient  Education Provided: Home Exercise Program, POC  Risk and Benefits Discussed with Patient/Caregiver/Other: yes  Patient/Caregiver Demonstrated Understanding: yes  Plan of Care Discussed and Agreed Upon: yes  Patient Response to Education: Patient/Caregiver Verbalized Understanding of Information    Goals:  Active       R RTC tendonitis; weakness R shld        Pt. will c/o less than 2/10 R shld pain with  use of UE for ADL's .        Start:  12/17/24    Expected End:  02/17/25            Pt. Will be indep with progressive HEP to cont. Progress made in PT.         Start:  12/17/24    Expected End:  03/17/25            Pt. will increase R shld AROM to wnl to allow increase dressing.        Start:  12/17/24    Expected End:  03/17/25            Pt. will increase R shld strength to wnl to allow increase household duties and work duties.       Start:  12/17/24    Expected End:  03/17/25

## 2024-12-30 ENCOUNTER — TREATMENT (OUTPATIENT)
Dept: PHYSICAL THERAPY | Facility: HOSPITAL | Age: 39
End: 2024-12-30
Payer: COMMERCIAL

## 2024-12-30 DIAGNOSIS — M75.81 RIGHT ROTATOR CUFF TENDINITIS: ICD-10-CM

## 2024-12-30 PROCEDURE — 97140 MANUAL THERAPY 1/> REGIONS: CPT | Mod: GP,CQ | Performed by: PHYSICAL THERAPY ASSISTANT

## 2024-12-30 PROCEDURE — 97110 THERAPEUTIC EXERCISES: CPT | Mod: GP,CQ | Performed by: PHYSICAL THERAPY ASSISTANT

## 2024-12-30 ASSESSMENT — PAIN SCALES - GENERAL: PAINLEVEL_OUTOF10: 5 - MODERATE PAIN

## 2024-12-30 ASSESSMENT — PAIN - FUNCTIONAL ASSESSMENT: PAIN_FUNCTIONAL_ASSESSMENT: 0-10

## 2024-12-30 NOTE — PROGRESS NOTES
"Physical Therapy    Physical Therapy Treatment    Patient Name: Cassie Simmons  MRN: [id number]  : 1985  Today's Date: 2024  Time Calculation  Start Time: 403  Stop Time: 445  Time Calculation (min): 42 min    PT Therapeutic Procedures Time Entry  Manual Therapy Time Entry: 10  Therapeutic Exercise Time Entry: 32          VISIT:# 2    Current Problem  1. Right rotator cuff tendinitis  Follow Up In Physical Therapy          Subjective Pt reported her pain is worse with movement Cassie stated she felt much better after manual ice massage.       Precautions  Precautions  STEADI Fall Risk Score (The score of 4 or more indicates an increased risk of falling): 0  Medical Precautions:  (7 years ago thyroid CA)       Pain  Pain Assessment: 0-10  0-10 (Numeric) Pain Score: 5 - Moderate pain  Pain Type: Acute pain  Pain Location: Shoulder  Pain Orientation: Right       Objective initiated exercises as per flow sheet.            Treatments:  EXERCISES       Date 24      VISIT# #2 # # #    REPS REPS REPS REPS          Table slides       flex X10 3\"H      scap X10 3\" H             UE ranger              When able:  Pulleys  Flex   Scap   ir     3 min  3 min             Tband  Row  Pull down  Ir  Er  bicep              Nu step  L2 x 8 min             Scapular squeeze 3 x30\"H             Manual  Ice Massage shoulder and upper traps x 10 min                                                                                                        HEP                 Outcome Measures:   Dash: 52.27    OP EDUCATION:  Access Code: [email or web address]  URL: [email or web address]  Date: 2024  Prepared by: Roxanna Curry    Assessment:  PT Assessment  PT Assessment Results: Decreased range of motion, Decreased strength, Pain  Rehab Prognosis: Good  Assessment Comment: Decreased pain to 2/10 at end of session. Decreased tightness.       Plan:  OP PT Plan  Treatment/Interventions: Cryotherapy, Hot pack, " Education/ Instruction, Self care/ home management, Therapeutic exercises, Therapeutic activities  PT Plan: Skilled PT  Rehab Potential: Good  Plan of Care Agreement: Patient    Goals:  Active       R RTC tendonitis; weakness R shld        Pt. will c/o less than 2/10 R shld pain with use of UE for ADL's .        Start:  12/17/24    Expected End:  02/17/25            Pt. Will be indep with progressive HEP to cont. Progress made in PT.         Start:  12/17/24    Expected End:  03/17/25            Pt. will increase R shld AROM to wnl to allow increase dressing.        Start:  12/17/24    Expected End:  03/17/25            Pt. will increase R shld strength to wnl to allow increase household duties and work duties.       Start:  12/17/24    Expected End:  03/17/25

## 2025-01-03 ENCOUNTER — APPOINTMENT (OUTPATIENT)
Dept: ORTHOPEDIC SURGERY | Facility: CLINIC | Age: 40
End: 2025-01-03
Payer: COMMERCIAL

## 2025-01-03 ENCOUNTER — HOSPITAL ENCOUNTER (OUTPATIENT)
Dept: RADIOLOGY | Facility: EXTERNAL LOCATION | Age: 40
Discharge: HOME | End: 2025-01-03

## 2025-01-03 VITALS — HEIGHT: 64 IN | WEIGHT: 145 LBS | BODY MASS INDEX: 24.75 KG/M2

## 2025-01-03 DIAGNOSIS — M75.81 RIGHT ROTATOR CUFF TENDINITIS: ICD-10-CM

## 2025-01-03 DIAGNOSIS — M75.01 ADHESIVE CAPSULITIS OF RIGHT SHOULDER: Primary | ICD-10-CM

## 2025-01-03 DIAGNOSIS — M25.511 RIGHT SHOULDER PAIN, UNSPECIFIED CHRONICITY: ICD-10-CM

## 2025-01-03 PROCEDURE — 20611 DRAIN/INJ JOINT/BURSA W/US: CPT | Performed by: EMERGENCY MEDICINE

## 2025-01-03 PROCEDURE — 99214 OFFICE O/P EST MOD 30 MIN: CPT | Performed by: EMERGENCY MEDICINE

## 2025-01-03 PROCEDURE — 1036F TOBACCO NON-USER: CPT | Performed by: EMERGENCY MEDICINE

## 2025-01-03 PROCEDURE — 3008F BODY MASS INDEX DOCD: CPT | Performed by: EMERGENCY MEDICINE

## 2025-01-03 RX ORDER — LIDOCAINE HYDROCHLORIDE 10 MG/ML
2 INJECTION, SOLUTION INFILTRATION; PERINEURAL
Status: COMPLETED | OUTPATIENT
Start: 2025-01-03 | End: 2025-01-03

## 2025-01-03 RX ORDER — METHYLPREDNISOLONE ACETATE 40 MG/ML
80 INJECTION, SUSPENSION INTRA-ARTICULAR; INTRALESIONAL; INTRAMUSCULAR; SOFT TISSUE
Status: COMPLETED | OUTPATIENT
Start: 2025-01-03 | End: 2025-01-03

## 2025-01-03 RX ADMIN — LIDOCAINE HYDROCHLORIDE 2 ML: 10 INJECTION, SOLUTION INFILTRATION; PERINEURAL at 08:54

## 2025-01-03 RX ADMIN — METHYLPREDNISOLONE ACETATE 80 MG: 40 INJECTION, SUSPENSION INTRA-ARTICULAR; INTRALESIONAL; INTRAMUSCULAR; SOFT TISSUE at 08:54

## 2025-01-03 ASSESSMENT — PAIN - FUNCTIONAL ASSESSMENT: PAIN_FUNCTIONAL_ASSESSMENT: 0-10

## 2025-01-03 ASSESSMENT — PAIN SCALES - GENERAL: PAINLEVEL_OUTOF10: 6

## 2025-01-03 NOTE — PROGRESS NOTES
Subjective    Patient ID: Cassie Simmons is a 39 y.o. female.    Chief Complaint: Pain of the Right Shoulder (Sub injection 11/20/24. Her shoulder fells better. She has started physical therapy at [facility]. She has no concerns today. )     Last Surgery: No surgery found  Last Surgery Date: No surgery found    Cassie is a very pleasant 39-year-old female who is coming in with about 3 weeks of right shoulder pain.  She is left-hand dominant.  She denies any traumatic event or known injury but states that about 3 weeks ago she woke up from sleep and was having pain in the lateral aspect of the right shoulder.  She is having pain with range of motion.  No weakness or numbness but she states that when she tries to use it her pain is worse.  She takes ibuprofen with good relief but after it wears off the pain returns.  The pain is diffuse but does not radiate up into the neck.  Today her pain is about 6 out of 10.  She denies any infectious symptoms.  No similar prior episodes.  She was referred here by her PCP, Susy Munoz. We agreed to perform a right subacromial cortisone injection under ultrasound guidance.  The patient tolerated the procedure well without any complications and activity modifications were reviewed.  She is also going to start physical therapy with an emphasis on developing and implementing a home exercise program and will begin taking meloxicam daily for the next month.  I will then follow-up with her in about 4 to 6 weeks to see how she is responding to this treatment plan.    Update on 1/3/2025.  Patient is coming back in for a follow-up visit for her right shoulder.  We did a subacromial injection on 11/20/2024 and the patient states that she has also been compliant with her meloxicam and physical therapy.  Overall she says that she is about 50% better but she is still having some trouble sleeping and still has not recovered her full range of motion.    Right Shoulder         Objective   Right  Shoulder Exam     Tenderness   The patient is experiencing no tenderness.    Range of Motion   Active abduction:  abnormal Right shoulder active abduction: Active abduction is actually slightly worse.  Only intact to about 100 degrees.  Forward flexion:  130     Muscle Strength   The patient has normal right shoulder strength.  Abduction: 4/5   Internal rotation: 5/5   External rotation: 4/5   Supraspinatus: 4/5   Subscapularis: 5/5   Biceps: 5/5     Tests   Walsh test: positive  Impingement: positive    Other   Erythema: absent  Sensation: normal  Pulse: present    Comments:  Some slight weakness on exam specifically with testing abduction, possibly secondary to guarding from pain.  No evidence of instability  Range of motion has actually worsened and is no longer fully intact.  She is still having impingement symptoms        Left Shoulder Exam   Left shoulder exam is normal.             Image Results:  Point of Care Ultrasound  These images are not reportable by radiology and will not be interpreted   by  Radiologists.    No new imaging    Patient ID: Cassie Simmons is a 39 y.o. female.    L Inj/Asp: R glenohumeral on 1/3/2025 8:54 AM  Indications: pain  Details: 22 G needle, ultrasound-guided posterior approach  Medications: 80 mg methylPREDNISolone acetate 40 mg/mL; 2 mL lidocaine 10 mg/mL (1 %)  Outcome: tolerated well, no immediate complications  Procedure, treatment alternatives, risks and benefits explained, specific risks discussed. Consent was given by the patient. Immediately prior to procedure a time out was called to verify the correct patient, procedure, equipment, support staff and site/side marked as required. Patient was prepped and draped in the usual sterile fashion.           Assessment/Plan   Encounter Diagnoses:  Adhesive capsulitis of right shoulder    Right shoulder pain, unspecified chronicity    Right rotator cuff tendinitis    Orders Placed This Encounter    L Inj/Asp    Point of Care  Ultrasound     No follow-ups on file.    We discussed her treatment options and I think she is actually starting to develop a little bit of adhesive capsulitis.  We therefore decided to perform a right glenohumeral cortisone injection under ultrasound guidance which was tolerated well without any complications and activity modifications were reviewed.  The patient is going to follow-up with me as needed moving forward and is going to continue her meloxicam, PT, and home exercises.  If this does not get her back to 100%, we are likely going to pursue additional imaging and may even consider referring her to orthopedic surgery.    ** Please excuse any errors in grammar or translation related to this dictation. Voice recognition software was utilized to prepare this document. **       Simon Botello MD  [facility]

## 2025-01-06 ENCOUNTER — TREATMENT (OUTPATIENT)
Dept: PHYSICAL THERAPY | Facility: HOSPITAL | Age: 40
End: 2025-01-06
Payer: COMMERCIAL

## 2025-01-06 DIAGNOSIS — R29.898 WEAKNESS OF RIGHT SHOULDER: Primary | ICD-10-CM

## 2025-01-06 DIAGNOSIS — M75.81 RIGHT ROTATOR CUFF TENDINITIS: ICD-10-CM

## 2025-01-06 PROCEDURE — 97110 THERAPEUTIC EXERCISES: CPT | Mod: GP | Performed by: PHYSICAL THERAPIST

## 2025-01-06 ASSESSMENT — PAIN - FUNCTIONAL ASSESSMENT: PAIN_FUNCTIONAL_ASSESSMENT: 0-10

## 2025-01-06 ASSESSMENT — PAIN SCALES - GENERAL: PAINLEVEL_OUTOF10: 4

## 2025-01-06 NOTE — PROGRESS NOTES
"Physical Therapy    Physical Therapy Treatment    Patient Name: Cassie Simmons  MRN: [id number]  Today's Date: 1/6/2025  Time Calculation  Start Time: 0848  Stop Time: 0932  Time Calculation (min): 44 min    [id number]     PT Therapeutic Procedures Time Entry  Therapeutic Exercise Time Entry: 39  PT Modalities Time Entry  Hot/Cold Pack Time Entry: 5        Visit: # 3      Assessment:  Pt. With ability to increase therEx today. IFC informed consent given and pt. Declines modality. 2/10 pain R shld after therEx and ice.         Plan:  Cont. Skilled PT including strength and ROM. OP PT Plan  Treatment/Interventions: Cryotherapy, Hot pack, Education/ Instruction, Self care/ home management, Therapeutic exercises, Therapeutic activities, Manual therapy, Taping techniques, Ultrasound, Electrical stimulation    Current Problem  1. Weakness of right shoulder        2. Right rotator cuff tendinitis  Follow Up In Physical Therapy          Subjective   HEP is going well. She saw the  And got another injection in her R shld.         Pain  Pain Assessment: 0-10  0-10 (Numeric) Pain Score: 4  Pain Type: Acute pain  Pain Location: Shoulder  Pain Orientation: Right    Objective      Offered and explained IFC - pt. Declined due to CA history even though its been 7 years.       Treatments:   Visit 1: 12/17/24 Eval and HEP  EXERCISES       Date 12/30/24 1/6/25     VISIT# #2 #3 # #    REPS REPS REPS REPS          Table slides       flex X10 3\"H For HEP- reviewed     scap X10 3\" H For HEP- reviewed     IR towel  For HEP- reviewed     UE ranger              When able:  Pulleys  Flex   Scap   ir     3 min  3 min     3'  3'  3'            Tband  Row  Pull down  Ir  Er  bicep    Red 10x2  Red 10x2  Red 10x2  Red 10x2              Nu step  L2 x 8 min             Scapular squeeze 3 x30\"H             Manual  Ice Massage shoulder and upper traps x 10 min                                                                                            "             HEP               OP EDUCATION:  Access Code: [email or web address]  URL: [email or web address]  Date: 01/06/2025  Prepared by: Roxanna Curry    Exercises  - Seated Shoulder Scaption Slide at Table Top with Forearm in Neutral  - 1 x daily - 7 x weekly - 2 sets - 10 reps  - Seated Shoulder Flexion Towel Slide at Table Top Full Range of Motion  - 1 x daily - 7 x weekly - 2 sets - 10 reps  - Standing Shoulder Internal Rotation Stretch with Towel  - 1 x daily - 7 x weekly - 1-2 sets - 10 reps     Goals:  Active       R RTC tendonitis; weakness R shld        Pt. will c/o less than 2/10 R shld pain with use of UE for ADL's .        Start:  12/17/24    Expected End:  02/17/25            Pt. Will be indep with progressive HEP to cont. Progress made in PT.   (Progressing)       Start:  12/17/24    Expected End:  03/17/25            Pt. will increase R shld AROM to wnl to allow increase dressing.        Start:  12/17/24    Expected End:  03/17/25            Pt. will increase R shld strength to wnl to allow increase household duties and work duties.       Start:  12/17/24    Expected End:  03/17/25

## 2025-01-10 ENCOUNTER — TREATMENT (OUTPATIENT)
Dept: PHYSICAL THERAPY | Facility: HOSPITAL | Age: 40
End: 2025-01-10
Payer: COMMERCIAL

## 2025-01-10 DIAGNOSIS — M75.81 RIGHT ROTATOR CUFF TENDINITIS: ICD-10-CM

## 2025-01-10 DIAGNOSIS — R29.898 WEAKNESS OF RIGHT SHOULDER: Primary | ICD-10-CM

## 2025-01-10 PROCEDURE — 97110 THERAPEUTIC EXERCISES: CPT | Mod: GP,CQ

## 2025-01-10 PROCEDURE — 97140 MANUAL THERAPY 1/> REGIONS: CPT | Mod: GP,CQ

## 2025-01-10 ASSESSMENT — PAIN - FUNCTIONAL ASSESSMENT: PAIN_FUNCTIONAL_ASSESSMENT: 0-10

## 2025-01-10 ASSESSMENT — PAIN SCALES - GENERAL: PAINLEVEL_OUTOF10: 2

## 2025-01-10 NOTE — PROGRESS NOTES
"Physical Therapy    Physical Therapy Treatment    Patient Name: Cassie Simmons  MRN: [id number]  : 1985  Today's Date: 1/10/2025  Time Calculation  Start Time: 830  Stop Time: 915  Time Calculation (min): 45 min    PT Therapeutic Procedures Time Entry  Manual Therapy Time Entry: 10  Therapeutic Exercise Time Entry: 35          VISIT:# 4    Current Problem  Problem List Items Addressed This Visit             ICD-10-CM    Right rotator cuff tendinitis M75.81    Weakness of right shoulder - Primary R29.898        Subjective    No falls reported since last visit. Pt reports she is doing her exercises at home and her shoulder is feeling much better.  Her daughter iced her arm and that helped.      Pain  Pain Assessment: 0-10  0-10 (Numeric) Pain Score: 2  Pain Location: Shoulder  Pain Orientation: Right       Objective    See flowsheet for exercise advancements             Precautions  Precautions  STEADI Fall Risk Score (The score of 4 or more indicates an increased risk of falling): 0  Medical Precautions:  (7 years ago thyroid CA)      Treatments:  Visit 1: 24 Eval and HEP  EXERCISES       Date 12/30/24 1/6/25 1/10/2025     VISIT# #2 #3 #4 #    REPS REPS REPS REPS          Table slides       flex X10 3\"H For HEP- reviewed     scap X10 3\" H For HEP- reviewed     IR towel  For HEP- reviewed     UE ranger              When able:  Pulleys  Flex   Scap   ir     3 min  3 min     3'  3'  3'     3'  3'  3'           Tband  Row  Pull down  Ir  Er  bicep    Red 10x2  Red 10x2  Red 10x2  Red 10x2     Red 10x2  Red 10x2  Red 10x2  Red 10x2  Red 10x2           Nu step  L2 x 8 min  10' @ L2           Scapular squeeze 3 x30\"H  10x 3\"H           Manual  Ice Massage shoulder and upper traps x 10 min  ice Massage shoulder and upper traps x 10 min    HEP           Assessment:   Pt tolerated treatment without complaint of increase in symptoms at the end of session.  IR hurts per pt.  Ice cup massage makes pt's shoulder.   "     Plan: Cont to progress strength of UE to try to cont to decrease pain   OP PT Plan  Treatment/Interventions: Cryotherapy, Hot pack, Education/ Instruction, Self care/ home management, Therapeutic exercises, Therapeutic activities, Manual therapy, Taping techniques, Ultrasound, Electrical stimulation  PT Plan: Skilled PT    Goals:  Active       R RTC tendonitis; weakness R shld        Pt. will c/o less than 2/10 R shld pain with use of UE for ADL's .        Start:  12/17/24    Expected End:  02/17/25            Pt. Will be indep with progressive HEP to cont. Progress made in PT.   (Progressing)       Start:  12/17/24    Expected End:  03/17/25            Pt. will increase R shld AROM to wnl to allow increase dressing.        Start:  12/17/24    Expected End:  03/17/25            Pt. will increase R shld strength to wnl to allow increase household duties and work duties.       Start:  12/17/24    Expected End:  03/17/25

## 2025-01-15 ENCOUNTER — TREATMENT (OUTPATIENT)
Dept: PHYSICAL THERAPY | Facility: HOSPITAL | Age: 40
End: 2025-01-15
Payer: COMMERCIAL

## 2025-01-15 DIAGNOSIS — R29.898 WEAKNESS OF RIGHT SHOULDER: Primary | ICD-10-CM

## 2025-01-15 DIAGNOSIS — M75.81 RIGHT ROTATOR CUFF TENDINITIS: ICD-10-CM

## 2025-01-15 PROCEDURE — 97110 THERAPEUTIC EXERCISES: CPT | Mod: GP,CQ

## 2025-01-15 ASSESSMENT — PAIN - FUNCTIONAL ASSESSMENT: PAIN_FUNCTIONAL_ASSESSMENT: 0-10

## 2025-01-15 ASSESSMENT — PAIN SCALES - GENERAL: PAINLEVEL_OUTOF10: 1

## 2025-01-15 NOTE — PROGRESS NOTES
"Physical Therapy    Physical Therapy Treatment    Patient Name: Cassie Simmons  MRN: [id number]  : 1985  Today's Date: 1/15/2025  Time Calculation  Start Time: 939  Stop Time: 0  Time Calculation (min): 41 min    PT Therapeutic Procedures Time Entry  Therapeutic Exercise Time Entry: 41          VISIT:# 5    Current Problem  Problem List Items Addressed This Visit             ICD-10-CM    Right rotator cuff tendinitis M75.81    Weakness of right shoulder - Primary R29.898        Subjective    No falls reported since last visit. Pt is icing at home.       Pain  Pain Assessment: 0-10  0-10 (Numeric) Pain Score: 1  Pain Location: Shoulder  Pain Orientation: Right       Objective    Access Code: [email or web address]  URL: [email or web address]  Date: 01/15/2025  Prepared by: Jose Kennedy    Exercises  - Shoulder Extension with Resistance  - 1-2 x daily - 5-7 x weekly - 3 sets - 10 reps  - Standing Shoulder Row with Anchored Resistance  - 1-2 x daily - 5-7 x weekly - 3 sets - 10 reps  - Shoulder External Rotation with Anchored Resistance (Mirrored)  - 1-2 x daily - 5-7 x weekly - 3 sets - 10 reps  - Shoulder Internal Rotation with Resistance  - 1-2 x daily - 5-7 x weekly - 3 sets - 10 reps  - Standing Bicep Curls with Resistance  - 1-2 x daily - 5-7 x weekly - 3 sets - 10 reps             Precautions  Precautions  STEADI Fall Risk Score (The score of 4 or more indicates an increased risk of falling): 0  Medical Precautions:  (7 years ago thyroid CA)  Precautions Comment: none      Treatments:  Visit 1: 24 Eval and HEP  EXERCISES     Date 1/10/2025  1/15/2025    VISIT# #4 #5    REPS REPS   When able:  Pulleys  Flex   Scap   ir     3'  3'  3'     3'  3'  3'        Tband  Row  Pull down  Ir  Er  bicep   Red 10x2  Red 10x2  Red 10x2  Red 10x2  Red 10x2   Red 10x2  Red 10x2  Red 10x2  Red 10x2  Red 10x2        Nu step  10' @ L2 10' @L3        Scapular squeeze 10x 3\"H 10x3\" hold        Manual  ice " Massage shoulder and upper traps x 10 min    HEP  Access Code: [email or web address]       Assessment:   Pt is a little sore with therapy, she reports it is with lifting her arm at certain angles. Pt was given ice to go.  Outpatient Education  Individual(s) Educated: Patient  Education Provided: Home Exercise Program  Equipment: Thera-Band (red)  Community Resources: yes  Risk and Benefits Discussed with Patient/Caregiver/Other: yes  Patient/Caregiver Demonstrated Understanding: yes  Plan of Care Discussed and Agreed Upon: yes  Patient Response to Education: Patient/Caregiver Verbalized Understanding of Information, Patient/Caregiver Performed Return Demonstration of Exercises/Activities, Patient/Caregiver Asked Appropriate Questions  Education Comment: Access Code: [email or web address]    Plan: Review HEP if needed.   OP PT Plan  Treatment/Interventions: Cryotherapy, Hot pack, Education/ Instruction, Self care/ home management, Therapeutic exercises, Therapeutic activities, Manual therapy, Taping techniques, Ultrasound, Electrical stimulation  PT Plan: Skilled PT    Goals:  Active       R RTC tendonitis; weakness R shld        Pt. will c/o less than 2/10 R shld pain with use of UE for ADL's .        Start:  12/17/24    Expected End:  02/17/25            Pt. Will be indep with progressive HEP to cont. Progress made in PT.   (Progressing)       Start:  12/17/24    Expected End:  03/17/25            Pt. will increase R shld AROM to wnl to allow increase dressing.        Start:  12/17/24    Expected End:  03/17/25            Pt. will increase R shld strength to wnl to allow increase household duties and work duties.       Start:  12/17/24    Expected End:  03/17/25

## 2025-01-17 ENCOUNTER — TREATMENT (OUTPATIENT)
Dept: PHYSICAL THERAPY | Facility: HOSPITAL | Age: 40
End: 2025-01-17
Payer: COMMERCIAL

## 2025-01-17 DIAGNOSIS — R29.898 WEAKNESS OF RIGHT SHOULDER: Primary | ICD-10-CM

## 2025-01-17 DIAGNOSIS — M75.81 RIGHT ROTATOR CUFF TENDINITIS: ICD-10-CM

## 2025-01-17 PROCEDURE — 97140 MANUAL THERAPY 1/> REGIONS: CPT | Mod: GP,CQ

## 2025-01-17 PROCEDURE — 97110 THERAPEUTIC EXERCISES: CPT | Mod: GP,CQ

## 2025-01-17 ASSESSMENT — PAIN - FUNCTIONAL ASSESSMENT: PAIN_FUNCTIONAL_ASSESSMENT: 0-10

## 2025-01-17 ASSESSMENT — PAIN SCALES - GENERAL: PAINLEVEL_OUTOF10: 3

## 2025-01-17 NOTE — PROGRESS NOTES
"Physical Therapy    Physical Therapy Treatment    Patient Name: Cassie Simmons  MRN: [id number]  : 1985  Today's Date: 2025  Time Calculation  Start Time: 830  Stop Time: 910  Time Calculation (min): 40 min    PT Therapeutic Procedures Time Entry  Manual Therapy Time Entry: 8  Therapeutic Exercise Time Entry: 32          VISIT:# 6    Current Problem  Problem List Items Addressed This Visit             ICD-10-CM    Right rotator cuff tendinitis M75.81    Weakness of right shoulder - Primary R29.898        Subjective    No falls reported since last visit. Pt is icing at home.  Pt is doing her HEP      Pain  Pain Assessment: 0-10  0-10 (Numeric) Pain Score: 3  Pain Location: Shoulder  Pain Orientation: Right       Objective    Increased some tband exercises from Level 2 (red) to level 3 (green)  see flowsheet    MMT:  IR/ER 4+/5                Flexion 4-/5   painful              Abduction 4-/5  painful           Precautions  Precautions  STEADI Fall Risk Score (The score of 4 or more indicates an increased risk of falling): 0  Medical Precautions:  (7 years ago thyroid CA)  Precautions Comment: none      Treatments:  Visit 1: 24 Eval and HEP  EXERCISES      Date 1/10/2025  1/15/2025  2025    VISIT# #4 #5 #6    REPS REPS    When able:  Pulleys  Flex   Scap   ir     3'  3'  3'     3'  3'  3'     3'  3'  3'         Tband  Row  Pull down  Ir  Er  bicep   Red 10x2  Red 10x2  Red 10x2  Red 10x2  Red 10x2   Red 10x2  Red 10x2  Red 10x2  Red 10x2  Red 10x2   2 x 10 green  2 x 10 green  2 x 10 red  2 x 10 red  2 x 10 green         Nu step  10' @ L2 10' @L3 10' @L3         Scapular squeeze 10x 3\"H 10x3\" hold          Manual  ice Massage shoulder and upper traps x 10 min  ice Massage shoulder and upper traps x 8min   HEP  Access Code: [email or web address]        Assessment:   Strength is progressing. MMT is painful in flexion/abduction        Plan: Cont to progress strength and ROM   OP PT Plan  Treatment/Interventions: " Cryotherapy, Hot pack, Education/ Instruction, Self care/ home management, Therapeutic exercises, Therapeutic activities, Manual therapy, Taping techniques, Ultrasound, Electrical stimulation  PT Plan: Skilled PT    Goals:  Active       R RTC tendonitis; weakness R shld        Pt. will c/o less than 2/10 R shld pain with use of UE for ADL's .  (Progressing)       Start:  12/17/24    Expected End:  02/17/25            Pt. Will be indep with progressive HEP to cont. Progress made in PT.   (Progressing)       Start:  12/17/24    Expected End:  03/17/25            Pt. will increase R shld AROM to wnl to allow increase dressing.        Start:  12/17/24    Expected End:  03/17/25            Pt. will increase R shld strength to wnl to allow increase household duties and work duties.       Start:  12/17/24    Expected End:  03/17/25

## 2025-01-21 ENCOUNTER — TREATMENT (OUTPATIENT)
Dept: PHYSICAL THERAPY | Facility: HOSPITAL | Age: 40
End: 2025-01-21
Payer: COMMERCIAL

## 2025-01-21 DIAGNOSIS — R29.898 WEAKNESS OF RIGHT SHOULDER: Primary | ICD-10-CM

## 2025-01-21 DIAGNOSIS — M75.81 RIGHT ROTATOR CUFF TENDINITIS: ICD-10-CM

## 2025-01-21 PROCEDURE — 97110 THERAPEUTIC EXERCISES: CPT | Mod: GP | Performed by: PHYSICAL THERAPIST

## 2025-01-21 ASSESSMENT — PAIN - FUNCTIONAL ASSESSMENT: PAIN_FUNCTIONAL_ASSESSMENT: 0-10

## 2025-01-21 ASSESSMENT — PAIN SCALES - GENERAL: PAINLEVEL_OUTOF10: 1

## 2025-01-21 NOTE — PROGRESS NOTES
"Physical Therapy    Physical Therapy Treatment /discharge    Patient Name: Cassie Simmons  MRN: [id number]  Today's Date: 1/21/2025  Time Calculation  Start Time: 0845  Stop Time: 0910  Time Calculation (min): 25 min1983     PT Therapeutic Procedures Time Entry  Therapeutic Exercise Time Entry: 25           Visit: # 7      Assessment:  Pt. Is wfl R shld ROM, she is with increase in strength but cont. Lacking strength. She was given progressive HEP and is indep with this.          Plan:  Discharge PT.      Current Problem  1. Weakness of right shoulder        2. Right rotator cuff tendinitis  Follow Up In Physical Therapy          Subjective   Pt. States she is with 1/10 R shld and things are getting easier at home with less pain R shld.          Pain  Pain Assessment: 0-10  0-10 (Numeric) Pain Score: 1  Pain Location: Shoulder  Pain Orientation: Right    Objective            Range of Motion:  Shld:  AROM  L wnl  R:   Flex: 165  Scap 165  ER to back of head  IR: T10         Strength:    L shld wnl    R shld :  4-/5 in gege ROM                  Gait:.wfl indep        Treatments:   Visit 1: 12/17/24 Eval and HEP  EXERCISES    recheck   Date 1/10/2025  1/15/2025  1/17/2025  1/21/25   VISIT# #4 #5 #6 7    REPS REPS     When able:  Pulleys  Flex   Scap   ir     3'  3'  3'     3'  3'  3'     3'  3'  3'     3  3  3          Tband  Row  Pull down  Ir  Er  bicep   Red 10x2  Red 10x2  Red 10x2  Red 10x2  Red 10x2   Red 10x2  Red 10x2  Red 10x2  Red 10x2  Red 10x2   2 x 10 green  2 x 10 green  2 x 10 red  2 x 10 red  2 x 10 green           Nu step  10' @ L2 10' @L3 10' @L3           Scapular squeeze 10x 3\"H 10x3\" hold            Manual  ice Massage shoulder and upper traps x 10 min  ice Massage shoulder and upper traps x 8min    HEP  Access Code: [email or web address]           OP EDUCATION:       Goals:  Resolved       R RTC tendonitis; weakness R shld        Pt. will c/o less than 2/10 R shld pain with use of UE for ADL's .  (Met)       " Start:  12/17/24    Expected End:  02/17/25    Resolved:  01/21/25         Pt. Will be indep with progressive HEP to cont. Progress made in PT.   (Met)       Start:  12/17/24    Expected End:  03/17/25    Resolved:  01/21/25         Pt. will increase R shld AROM to wnl to allow increase dressing.  (Met)       Start:  12/17/24    Expected End:  03/17/25    Resolved:  01/21/25         Pt. will increase R shld strength to wnl to allow increase household duties and work duties. (Adequate for Discharge)       Start:  12/17/24    Expected End:  03/17/25

## 2025-03-10 ENCOUNTER — APPOINTMENT (OUTPATIENT)
Dept: OBSTETRICS AND GYNECOLOGY | Facility: CLINIC | Age: 40
End: 2025-03-10
Payer: COMMERCIAL

## 2025-03-10 VITALS
SYSTOLIC BLOOD PRESSURE: 102 MMHG | DIASTOLIC BLOOD PRESSURE: 80 MMHG | HEIGHT: 64 IN | BODY MASS INDEX: 25.1 KG/M2 | WEIGHT: 147 LBS

## 2025-03-10 DIAGNOSIS — Z12.31 SCREENING MAMMOGRAM, ENCOUNTER FOR: ICD-10-CM

## 2025-03-10 DIAGNOSIS — N92.6 IRREGULAR BLEEDING: Primary | ICD-10-CM

## 2025-03-10 LAB — PREGNANCY TEST URINE, POC: NEGATIVE

## 2025-03-10 PROCEDURE — 3008F BODY MASS INDEX DOCD: CPT | Performed by: OBSTETRICS & GYNECOLOGY

## 2025-03-10 PROCEDURE — 1036F TOBACCO NON-USER: CPT | Performed by: OBSTETRICS & GYNECOLOGY

## 2025-03-10 PROCEDURE — 99396 PREV VISIT EST AGE 40-64: CPT | Performed by: OBSTETRICS & GYNECOLOGY

## 2025-03-10 PROCEDURE — 81025 URINE PREGNANCY TEST: CPT | Performed by: OBSTETRICS & GYNECOLOGY

## 2025-03-10 NOTE — PROGRESS NOTES
Subjective   Patient ID: Cassie Simmons is a 39 y.o. female who presents for annual.  HPI 39 years old  with prior tubal sterilization presents with chief complaint of a regular heavy periods  She says she has always had regular cycles until now.  She denies any pelvic pain but has cramping with the flow.  She has a history of negative Pap smear and negative HPV last Pap was in .  She denies any hot flashes or night sweats.  She is up-to-date with mammogram.  She denies any changes in her medical history, allergies or medication.    Review of Systems   All other systems reviewed and are negative.      Objective   Physical Exam  Vitals reviewed.   Constitutional:       Appearance: Normal appearance.   HENT:      Head: Normocephalic and atraumatic.      Nose: Nose normal.   Cardiovascular:      Rate and Rhythm: Normal rate and regular rhythm.   Pulmonary:      Effort: Pulmonary effort is normal.      Breath sounds: Normal breath sounds.   Chest:      Chest wall: No mass.   Breasts:     Right: Normal.      Left: Normal.   Abdominal:      General: Abdomen is flat. Bowel sounds are normal. There is no distension.      Palpations: Abdomen is soft. There is no mass.   Genitourinary:     General: Normal vulva.      Vagina: Normal.      Cervix: Normal.      Uterus: Normal.       Adnexa: Right adnexa normal and left adnexa normal.      Rectum: Normal.   Musculoskeletal:         General: Normal range of motion.      Cervical back: Normal range of motion.   Skin:     General: Skin is warm and dry.   Neurological:      General: No focal deficit present.      Mental Status: She is alert.   Psychiatric:         Mood and Affect: Mood normal.         Behavior: Behavior normal.         Assessment/Plan   Problem List Items Addressed This Visit    None  Visit Diagnoses         Codes    Irregular bleeding    -  Primary N92.6    Relevant Orders    TSH with reflex to Free T4 if abnormal    Prolactin    FSH & LH    POCT pregnancy,  urine manually resulted (Completed)    Screening mammogram, encounter for     Z12.31    Relevant Orders    TSH with reflex to Free T4 if abnormal    Prolactin    FSH & LH        Urine pregnancy was negative.  I have recommended some blood work and have her return after blood work to discuss options to treat her cycles.         Ravinder Samuel MD 03/10/25 8:42 AM

## 2025-03-11 LAB
FSH SERPL-ACNC: 10 MIU/ML
LH SERPL-ACNC: 10.4 MIU/ML
PROLACTIN SERPL-MCNC: 9.9 NG/ML
T4 FREE SERPL-MCNC: 2.1 NG/DL (ref 0.8–1.8)
TSH SERPL-ACNC: 0.01 MIU/L

## 2025-04-01 ENCOUNTER — APPOINTMENT (OUTPATIENT)
Dept: PRIMARY CARE | Facility: CLINIC | Age: 40
End: 2025-04-01
Payer: COMMERCIAL

## 2025-04-01 VITALS
SYSTOLIC BLOOD PRESSURE: 101 MMHG | HEIGHT: 65 IN | DIASTOLIC BLOOD PRESSURE: 69 MMHG | BODY MASS INDEX: 24.49 KG/M2 | WEIGHT: 147 LBS | HEART RATE: 76 BPM | RESPIRATION RATE: 14 BRPM | OXYGEN SATURATION: 96 % | TEMPERATURE: 97.1 F

## 2025-04-01 DIAGNOSIS — E89.0 POSTOPERATIVE HYPOTHYROIDISM: Primary | ICD-10-CM

## 2025-04-01 DIAGNOSIS — Z00.00 HEALTHCARE MAINTENANCE: ICD-10-CM

## 2025-04-01 DIAGNOSIS — C73 THYROID CANCER (MULTI): ICD-10-CM

## 2025-04-01 DIAGNOSIS — Z12.31 BREAST CANCER SCREENING BY MAMMOGRAM: ICD-10-CM

## 2025-04-01 PROCEDURE — 1036F TOBACCO NON-USER: CPT

## 2025-04-01 PROCEDURE — 3008F BODY MASS INDEX DOCD: CPT

## 2025-04-01 PROCEDURE — 99396 PREV VISIT EST AGE 40-64: CPT

## 2025-04-01 PROCEDURE — 99213 OFFICE O/P EST LOW 20 MIN: CPT

## 2025-04-01 RX ORDER — LEVOTHYROXINE SODIUM 100 UG/1
100 TABLET ORAL DAILY
Qty: 90 TABLET | Refills: 0 | Status: SHIPPED | OUTPATIENT
Start: 2025-04-01

## 2025-04-01 SDOH — ECONOMIC STABILITY: FOOD INSECURITY: WITHIN THE PAST 12 MONTHS, THE FOOD YOU BOUGHT JUST DIDN'T LAST AND YOU DIDN'T HAVE MONEY TO GET MORE.: NEVER TRUE

## 2025-04-01 SDOH — ECONOMIC STABILITY: FOOD INSECURITY: WITHIN THE PAST 12 MONTHS, YOU WORRIED THAT YOUR FOOD WOULD RUN OUT BEFORE YOU GOT MONEY TO BUY MORE.: NEVER TRUE

## 2025-04-01 ASSESSMENT — ANXIETY QUESTIONNAIRES
7. FEELING AFRAID AS IF SOMETHING AWFUL MIGHT HAPPEN: NOT AT ALL
6. BECOMING EASILY ANNOYED OR IRRITABLE: NOT AT ALL
4. TROUBLE RELAXING: NOT AT ALL
IF YOU CHECKED OFF ANY PROBLEMS ON THIS QUESTIONNAIRE, HOW DIFFICULT HAVE THESE PROBLEMS MADE IT FOR YOU TO DO YOUR WORK, TAKE CARE OF THINGS AT HOME, OR GET ALONG WITH OTHER PEOPLE: NOT DIFFICULT AT ALL
2. NOT BEING ABLE TO STOP OR CONTROL WORRYING: NOT AT ALL
3. WORRYING TOO MUCH ABOUT DIFFERENT THINGS: NOT AT ALL
1. FEELING NERVOUS, ANXIOUS, OR ON EDGE: NOT AT ALL
GAD7 TOTAL SCORE: 0
5. BEING SO RESTLESS THAT IT IS HARD TO SIT STILL: NOT AT ALL

## 2025-04-01 ASSESSMENT — PATIENT HEALTH QUESTIONNAIRE - PHQ9
1. LITTLE INTEREST OR PLEASURE IN DOING THINGS: NOT AT ALL
2. FEELING DOWN, DEPRESSED OR HOPELESS: NOT AT ALL
SUM OF ALL RESPONSES TO PHQ9 QUESTIONS 1 & 2: 0

## 2025-04-01 ASSESSMENT — LIFESTYLE VARIABLES
AUDIT-C TOTAL SCORE: 0
HOW OFTEN DO YOU HAVE A DRINK CONTAINING ALCOHOL: NEVER
HOW MANY STANDARD DRINKS CONTAINING ALCOHOL DO YOU HAVE ON A TYPICAL DAY: PATIENT DOES NOT DRINK
HOW OFTEN DO YOU HAVE SIX OR MORE DRINKS ON ONE OCCASION: NEVER
SKIP TO QUESTIONS 9-10: 1

## 2025-04-01 ASSESSMENT — ENCOUNTER SYMPTOMS
MUSCULOSKELETAL NEGATIVE: 1
FATIGUE: 1
HEMATOLOGIC/LYMPHATIC NEGATIVE: 1
SHORTNESS OF BREATH: 1
NERVOUS/ANXIOUS: 1
CONSTIPATION: 1
NEUROLOGICAL NEGATIVE: 1
EYES NEGATIVE: 1
PALPITATIONS: 1

## 2025-04-01 ASSESSMENT — PAIN SCALES - GENERAL: PAINLEVEL_OUTOF10: 0-NO PAIN

## 2025-04-01 NOTE — ASSESSMENT & PLAN NOTE
TSH decreased at 0.01 and T4 elevated at 2.1, having issues with menstrual cycles  Reports feeling very cold but also with anxiety, palpitations, shortness of breath  Feel her symptoms are likely s/t high T4 levels and     Decrease levothyroxine to 100mcg daily; will have her skip a day of replacement prior to starting  Repeat TSH and T4 in 2 months  Advised to get on waitlist for sooner appt with Dr. Gaines in endocrinology  Case briefly discussed with my CP Dr. Treviño who recommended she see thyroid surgery given history of thyroidectomy (done in [place])

## 2025-04-01 NOTE — PROGRESS NOTES
Subjective   Patient ID: Cassie Simmons is a 39 y.o. female who presents for CPE and concerns for abnormal menstruation/thyroid issues.    Reports feeling very cold especially in the evening, very rarely feeling hot. TSH 0.01, T4 elevated at 2.1.  Has been losing weight- down 6 lbs over the past month.   Feels like dry skin, feeling cold, feeling sleepy are secondary to thyroid abnormality.     Has had episodes of palpitations, feeling short of breath and feeling like she was going to pass out.  Has had shortness of breath for the past 2 months and has some chest pressure.   Frequently feeling anxious.   Having constipation; tried probiotics and digestive enzymes which are helping.     Diet: Eating pretty healthy, eating a lot of fast food due to working long shifts (10 hours), carryout and fast food. Tries toward healthy options when ordering out. Doesn't think she's getting many veggies, getting good amount of fruit. Eating good amount of protein. Gets headache if not drinking soda daily, drinking 3 cans of soda per day. Eating a lot of milk chocolate, [employer] cups.  Exercise: Walking a lot at work  Weight: Down 6lbs over the last month likely s/t medication induced hyperthyroid  Water: Drinking water with medicine in the morning, otherwise none the rest of the day, will have 1-2 cups of it during the day. Gets nauseated during the day if she drinks a full glass during the day.   Sleep: Trouble sleeping. Some days better than others, getting about 4-5 hours per night, taking melatonin to help fall asleep which helps  Social: , lives in a 2 floor home. 3 children 11, 12, 15  Professional: Works as security  at [employer]    Review of Systems   Constitutional:  Positive for fatigue.   HENT: Negative.     Eyes: Negative.    Respiratory:  Positive for shortness of breath.    Cardiovascular:  Positive for chest pain and palpitations.   Gastrointestinal:  Positive for constipation.   Endocrine: Positive for cold  "intolerance.   Genitourinary: Negative.    Musculoskeletal: Negative.    Skin: Negative.    Neurological: Negative.    Hematological: Negative.    Psychiatric/Behavioral:  The patient is nervous/anxious.         Current Outpatient Medications   Medication Sig Dispense Refill    ergocalciferol (Vitamin D-2) 1.25 MG (40700 UT) capsule Take 1 capsule (1,250 mcg) by mouth 1 (one) time per week. 12 capsule 1    meloxicam (Mobic) 15 mg tablet Take 1 tablet (15 mg) by mouth once daily. 360 tablet 0    triamcinolone (Kenalog) 0.1 % cream Apply topically 2 times a day. Apply to affected area 1-2 times daily as needed. 45 g 3    levothyroxine (Synthroid, Levoxyl) 100 mcg tablet Take 1 tablet (100 mcg) by mouth early in the morning.. Take on an empty stomach at the same time each day, either 30 to 60 minutes prior to breakfast 90 tablet 0     No current facility-administered medications for this visit.     Past Surgical History:   Procedure Laterality Date    CT ABDOMEN PELVIS ANGIOGRAM W AND/OR WO IV CONTRAST  10/17/2022    CT ABDOMEN PELVIS ANGIOGRAM W AND/OR WO IV CONTRAST 10/17/2022 [facility]    OTHER SURGICAL HISTORY  12/04/2019    Appendectomy    OTHER SURGICAL HISTORY  12/04/2019    Thyroidectomy    OTHER SURGICAL HISTORY  12/04/2019    Tubal ligation    OTHER SURGICAL HISTORY  02/01/2021    Radiofrequency ablation     Family History   Problem Relation Name Age of Onset    Breast cancer Mother's Sister        Social History     Tobacco Use    Smoking status: Never    Smokeless tobacco: Never   Vaping Use    Vaping status: Never Used   Substance Use Topics    Alcohol use: Never    Drug use: Never        Objective     Visit Vitals  /69 (BP Location: Left arm, Patient Position: Sitting, BP Cuff Size: Adult)   Pulse 76   Temp 36.2 °C (97.1 °F) (Temporal)   Resp 14   Ht 1.651 m (5' 5\")   Wt 66.7 kg (147 lb)   LMP 03/05/2025   SpO2 96%   BMI 24.46 kg/m²   OB Status Having periods   Smoking Status Never   BSA " 1.75 m²        Physical Exam      Assessment/Plan   Problem List Items Addressed This Visit       Postoperative hypothyroidism - Primary     TSH decreased at 0.01 and T4 elevated at 2.1, having issues with menstrual cycles  Reports feeling very cold but also with anxiety, palpitations, shortness of breath  Feel her symptoms are likely s/t high T4 levels and     Decrease levothyroxine to 100mcg daily; will have her skip a day of replacement prior to starting  Repeat TSH and T4 in 2 months  Advised to get on waitlist for sooner appt with Dr. Gaines in endocrinology  Case briefly discussed with my CP Dr. Treviño who recommended she see thyroid surgery given history of thyroidectomy (done in [place])         Relevant Orders    Referral to General Surgery    Healthcare maintenance     -Healthy diet, good quality and duration of sleep, healthy water intake, regular exercise and healthy weight discussed  -Immunizations:   Flu: Recommended annually  Tdap: Recommended and administered today  -Following with dentistry and optometry regularly  -Colon cancer screening: Not currently indicated  -Mammogram: Last in April 2024, repeat study ordered  -GYN: Following annually  -Some concerns for anxiety/depression, anxiety seems s/t thyroid at this time  -Tobacco never, EtOH none, No illicit/recreational drugs  -Feeling safe at home           Other Visit Diagnoses       Thyroid cancer (Multi)        Relevant Medications    levothyroxine (Synthroid, Levoxyl) 100 mcg tablet    Other Relevant Orders    Tsh With Reflex To Free T4 If Abnormal    Breast cancer screening by mammogram        Relevant Orders    BI mammo bilateral screening tomosynthesis            All pertinent lab work and results were reviewed with patient.     Follow up with me in 6 months to evaluate for resolution of symptoms    JIGNA Lozano-CNS

## 2025-04-01 NOTE — PATIENT INSTRUCTIONS
Thank you for coming to see me today.  If you have any questions or concerns following our visit, please contact the office.  Phone: [phone]    Follow up with me in 6 months or sooner as needed    1)  Call Dr. Gaines' office and ask to get on wait or cancellation list for sooner appointment than June    2) Skip Wednesday dose of levothyroxine then DECREASE levothyroxine to 100mcg daily due to too much thyroid hormone    3) Get repeat TSH with reflex to T4 a few days before next appointment with Dr. Gaines    4) START magnesium citrate, drink 1 bottle to help move bowels. After this, START docusate or senna (Colace or Senna) 1 tablet twice daily to help move bowels    5) INCREASE water intake to 3-4 bottles per day    6) I am referring you to Dr. Dumont in thyroid surgery for evaluation of abnormal thyroid levels - please call [phone] to schedule an appointment or schedule at  on your way out

## 2025-04-01 NOTE — ASSESSMENT & PLAN NOTE
-Healthy diet, good quality and duration of sleep, healthy water intake, regular exercise and healthy weight discussed  -Immunizations:   Flu: Recommended annually  Tdap: Recommended and administered today  -Following with dentistry and optometry regularly  -Colon cancer screening: Not currently indicated  -Mammogram: Last in April 2024, repeat study ordered  -GYN: Following annually  -Some concerns for anxiety/depression, anxiety seems s/t thyroid at this time  -Tobacco never, EtOH none, No illicit/recreational drugs  -Feeling safe at home

## 2025-04-10 ENCOUNTER — HOSPITAL ENCOUNTER (OUTPATIENT)
Dept: RADIOLOGY | Facility: CLINIC | Age: 40
Discharge: HOME | End: 2025-04-10
Payer: COMMERCIAL

## 2025-04-10 VITALS — WEIGHT: 147 LBS | BODY MASS INDEX: 24.49 KG/M2 | HEIGHT: 65 IN

## 2025-04-10 DIAGNOSIS — Z12.31 BREAST CANCER SCREENING BY MAMMOGRAM: ICD-10-CM

## 2025-04-10 PROCEDURE — 77063 BREAST TOMOSYNTHESIS BI: CPT

## 2025-04-10 PROCEDURE — 77067 SCR MAMMO BI INCL CAD: CPT

## 2025-04-17 ENCOUNTER — PATIENT MESSAGE (OUTPATIENT)
Dept: ENDOCRINOLOGY | Facility: CLINIC | Age: 40
End: 2025-04-17

## 2025-04-17 ENCOUNTER — OFFICE VISIT (OUTPATIENT)
Dept: SURGERY | Facility: CLINIC | Age: 40
End: 2025-04-17
Payer: COMMERCIAL

## 2025-04-17 VITALS — BODY MASS INDEX: 24.96 KG/M2 | WEIGHT: 150 LBS

## 2025-04-17 DIAGNOSIS — E89.0 POSTOPERATIVE HYPOTHYROIDISM: ICD-10-CM

## 2025-04-17 ASSESSMENT — ENCOUNTER SYMPTOMS
LOSS OF SENSATION IN FEET: 0
OCCASIONAL FEELINGS OF UNSTEADINESS: 0
DEPRESSION: 0

## 2025-05-02 DIAGNOSIS — E55.9 VITAMIN D DEFICIENCY: ICD-10-CM

## 2025-05-03 RX ORDER — ERGOCALCIFEROL 1.25 MG/1
1.25 CAPSULE ORAL
Qty: 12 CAPSULE | Refills: 1 | Status: SHIPPED | OUTPATIENT
Start: 2025-05-04

## 2025-06-23 ENCOUNTER — APPOINTMENT (OUTPATIENT)
Dept: ENDOCRINOLOGY | Facility: CLINIC | Age: 40
End: 2025-06-23
Payer: COMMERCIAL

## 2025-06-23 VITALS
BODY MASS INDEX: 24.59 KG/M2 | HEIGHT: 64 IN | SYSTOLIC BLOOD PRESSURE: 120 MMHG | DIASTOLIC BLOOD PRESSURE: 80 MMHG | HEART RATE: 88 BPM | WEIGHT: 144 LBS

## 2025-06-23 DIAGNOSIS — E55.9 VITAMIN D DEFICIENCY: ICD-10-CM

## 2025-06-23 DIAGNOSIS — E89.0 POSTOPERATIVE PRIMARY HYPOTHYROIDISM: Primary | ICD-10-CM

## 2025-06-23 DIAGNOSIS — R53.83 OTHER FATIGUE: ICD-10-CM

## 2025-06-23 DIAGNOSIS — C73 THYROID CANCER (MULTI): ICD-10-CM

## 2025-06-23 PROCEDURE — 99214 OFFICE O/P EST MOD 30 MIN: CPT | Performed by: INTERNAL MEDICINE

## 2025-06-23 PROCEDURE — 3008F BODY MASS INDEX DOCD: CPT | Performed by: INTERNAL MEDICINE

## 2025-06-23 RX ORDER — DIPHENHYDRAMINE HYDROCHLORIDE 12.5 MG/1
12.5 BAR, CHEWABLE ORAL EVERY 6 HOURS PRN
COMMUNITY

## 2025-06-23 ASSESSMENT — ENCOUNTER SYMPTOMS: TREMORS: 1

## 2025-06-23 NOTE — PROGRESS NOTES
Jericho Simmons is a 40 y.o. female who presents for follow up for postoperative hypothyroidism. She was last seen in June 2023.      She was diagnosed with papillary thyroid cancer in [place]. She initially had a thyroid ultrasound on October 16, 2015. On repeat thyroid ultrasound from October 24, 2016 she was found to have a thyroid gland measuring 4.2 x 1.7 x 1.9 cm to the right lobe and 4.2 x 1.4 x 2.0 cm to the left lobe. There was a solid nodule of 1.4 cm in the left lobe. Findings Were Stable Compared to Her Initial Ultrasound in 2015. On January 24, 2017 a Repeat Thyroid Ultrasound Revealed Heterogeneous and Slightly Enlarged Thyroid Gland with Bilateral Thyroid Nodules. She Underwent Fine-Needle Aspiration Biopsy Which Revealed Occasional Papillary Structures. She then went on to have a total thyroidectomy on January 26, 2017. The pathology report revealed papillary thyroid carcinoma of the classical subtype. Tumor size was 1 cm with uninvolved margins. There was no extrathyroidal invasion. There was some focal lymphovascular invasion. Lymph nodes were negative. She then went on to have radioactive iodine ablation in February 2017.     She had a thyroid ultrasound in January 5, 2018 which was unremarkable.     Current Regimen  Levothyroxine 100 mcg p.o. daily      Symptomatology as related to thyroid disease is as listed below:  Energy levels - fatigued  Sleep - poor quality; woke up at 3am; gets 5 hours consistnetly   Tremors - admits; daily   Palpitations- almost daily    Temperature intolerances - more cold   Mood - depressed; increased anxiety with presentations   Muscular cramps - admits  Bowel movements - varies between constipated and diarrhea; uses probiotic   Hair changes -  shedding   Skin changes - more sensitive for 2 years; having increased allergies and is taking an anti-histamine; had biopsy which was unrevealing; dry and itchy   Weight changes -lost       Menstrual  "history:  January - period every 9 days  LMP four days ago   April and May periods were normal      Review of Systems   Neurological:  Positive for tremors. Negative for syncope.   All other systems reviewed and are negative.      Objective   /80 (BP Location: Left arm, Patient Position: Sitting, BP Cuff Size: Adult)   Pulse 88   Ht 1.626 m (5' 4\")   Wt 65.3 kg (144 lb)   BMI 24.72 kg/m²    Physical Exam  Vitals and nursing note reviewed.   Constitutional:       General: She is not in acute distress.     Appearance: Normal appearance. She is normal weight.   HENT:      Head: Normocephalic and atraumatic.      Nose: Nose normal.      Mouth/Throat:      Mouth: Mucous membranes are moist.   Eyes:      Extraocular Movements: Extraocular movements intact.   Neck:      Comments: Healed thyroidectomy scar   Cardiovascular:      Rate and Rhythm: Normal rate and regular rhythm.   Pulmonary:      Effort: Pulmonary effort is normal.      Breath sounds: Normal breath sounds.   Musculoskeletal:         General: Normal range of motion.   Skin:     General: Skin is warm.   Neurological:      Mental Status: She is alert and oriented to person, place, and time.      Comments: No tremors to outstretched hands     Psychiatric:         Mood and Affect: Mood normal.         Lab Results   Component Value Date    TSH 0.01 (L) 03/10/2025    FREET4 2.1 (H) 03/10/2025       Assessment/Plan   40-year-old [place] female with papillary thyroid cancer status post total thyroidectomy in January 2017 followed by radioactive iodine ablation in February 2017 presents for  follow-up. She is at low risk for recurrence given her pathologic stage of pT1a No and age.     Postoperative primary hypothyroidism  To obtain blood tests today   To continue Levothyroxine 100mcg po daily  Take levothyroxine on an empty stomach with water alone, 1 hour before eating or taking other medications, 4 hours before any calcium or iron " supplement    Thyroid cancer (Multi)  To obtain TG and anti-TG values     Vitamin D deficiency  To obtain a Vitamin D level     Other fatigue  To obtain CBC, iron panel, Vitamin B12 levels    For follow up in 6 months

## 2025-06-23 NOTE — PATIENT INSTRUCTIONS
Thank you for choosing [facility]  for your health care needs.  If you have any questions, concerns or medical needs, please feel free to contact our office at [phone].    Please ensure you complete your blood work one week before the next scheduled appointment.    To obtain blood tests today   To continue Levothyroxine 100mcg po daily  Take levothyroxine on an empty stomach with water alone, 1 hour before eating or taking other medications, 4 hours before any calcium or iron supplement  For follow up in 6 months

## 2025-06-26 LAB
25(OH)D3+25(OH)D2 SERPL-MCNC: 34 NG/ML (ref 30–100)
ALBUMIN SERPL-MCNC: 4.3 G/DL (ref 3.6–5.1)
ALP SERPL-CCNC: 53 U/L (ref 31–125)
ALT SERPL-CCNC: 7 U/L (ref 6–29)
ANION GAP SERPL CALCULATED.4IONS-SCNC: 7 MMOL/L (CALC) (ref 7–17)
AST SERPL-CCNC: 11 U/L (ref 10–30)
BILIRUB SERPL-MCNC: 0.4 MG/DL (ref 0.2–1.2)
BUN SERPL-MCNC: 9 MG/DL (ref 7–25)
CALCIUM SERPL-MCNC: 8.4 MG/DL (ref 8.6–10.2)
CHLORIDE SERPL-SCNC: 108 MMOL/L (ref 98–110)
CO2 SERPL-SCNC: 25 MMOL/L (ref 20–32)
CREAT SERPL-MCNC: 0.79 MG/DL (ref 0.5–0.99)
EGFRCR SERPLBLD CKD-EPI 2021: 96 ML/MIN/1.73M2
ERYTHROCYTE [DISTWIDTH] IN BLOOD BY AUTOMATED COUNT: 14.4 % (ref 11–15)
GLUCOSE SERPL-MCNC: 85 MG/DL (ref 65–99)
HCT VFR BLD AUTO: 39.2 % (ref 35–45)
HGB BLD-MCNC: 11.6 G/DL (ref 11.7–15.5)
IRON SATN MFR SERPL: 11 % (CALC) (ref 16–45)
IRON SERPL-MCNC: 36 MCG/DL (ref 40–190)
MCH RBC QN AUTO: 27.2 PG (ref 27–33)
MCHC RBC AUTO-ENTMCNC: 29.6 G/DL (ref 32–36)
MCV RBC AUTO: 91.8 FL (ref 80–100)
PLATELET # BLD AUTO: 261 THOUSAND/UL (ref 140–400)
PMV BLD REES-ECKER: 12.4 FL (ref 7.5–12.5)
POTASSIUM SERPL-SCNC: 4.3 MMOL/L (ref 3.5–5.3)
PROT SERPL-MCNC: 6.8 G/DL (ref 6.1–8.1)
RBC # BLD AUTO: 4.27 MILLION/UL (ref 3.8–5.1)
SODIUM SERPL-SCNC: 140 MMOL/L (ref 135–146)
T4 FREE SERPL-MCNC: 1.8 NG/DL (ref 0.8–1.8)
THYROGLOB AB SERPL-ACNC: <1 IU/ML
THYROGLOB SERPL-MCNC: <0.1 NG/ML
TIBC SERPL-MCNC: 321 MCG/DL (CALC) (ref 250–450)
TSH SERPL-ACNC: 0.03 MIU/L
VIT B12 SERPL-MCNC: 317 PG/ML (ref 200–1100)
WBC # BLD AUTO: 3.9 THOUSAND/UL (ref 3.8–10.8)

## 2025-06-29 PROBLEM — R53.83 OTHER FATIGUE: Status: ACTIVE | Noted: 2025-06-29

## 2025-06-29 RX ORDER — LEVOTHYROXINE SODIUM 100 UG/1
100 TABLET ORAL DAILY
Qty: 90 TABLET | Refills: 1 | Status: SHIPPED | OUTPATIENT
Start: 2025-06-29

## 2025-06-29 NOTE — ASSESSMENT & PLAN NOTE
To obtain blood tests today   To continue Levothyroxine 100mcg po daily  Take levothyroxine on an empty stomach with water alone, 1 hour before eating or taking other medications, 4 hours before any calcium or iron supplement

## 2025-06-29 NOTE — RESULT ENCOUNTER NOTE
Labs have been reviewed.  The TSH is still too suppressed.  Please decrease Levothyroxine 100mcg to 1 tablet daily for 6 days per week only.  There is anemia with low iron levels.  This can be a source of fatigue as well.  Please start on an over the counter iron supplement. The kidney, liver, B12, Vitamin D and tumor marker look good.  For follow  up as scheduled.

## 2025-09-09 ENCOUNTER — APPOINTMENT (OUTPATIENT)
Dept: OBSTETRICS AND GYNECOLOGY | Facility: CLINIC | Age: 40
End: 2025-09-09
Payer: COMMERCIAL

## 2025-10-01 ENCOUNTER — APPOINTMENT (OUTPATIENT)
Dept: PRIMARY CARE | Facility: CLINIC | Age: 40
End: 2025-10-01
Payer: COMMERCIAL

## 2025-12-15 ENCOUNTER — APPOINTMENT (OUTPATIENT)
Dept: ENDOCRINOLOGY | Facility: CLINIC | Age: 40
End: 2025-12-15
Payer: COMMERCIAL